# Patient Record
Sex: MALE | Race: OTHER | Employment: UNEMPLOYED | URBAN - METROPOLITAN AREA
[De-identification: names, ages, dates, MRNs, and addresses within clinical notes are randomized per-mention and may not be internally consistent; named-entity substitution may affect disease eponyms.]

---

## 2017-06-09 PROBLEM — N20.1 URETERAL CALCULUS, LEFT: Status: ACTIVE | Noted: 2017-06-09

## 2021-08-27 PROBLEM — F10.10 ALCOHOL ABUSE: Status: ACTIVE | Noted: 2021-08-27

## 2021-08-30 PROBLEM — F10.20 SEVERE ALCOHOL USE DISORDER (HCC): Chronic | Status: ACTIVE | Noted: 2021-08-27

## 2021-08-30 PROBLEM — F10.930 ALCOHOL WITHDRAWAL SYNDROME WITHOUT COMPLICATION (HCC): Status: ACTIVE | Noted: 2021-08-30

## 2021-08-31 PROBLEM — R31.0 GROSS HEMATURIA: Status: ACTIVE | Noted: 2021-08-31

## 2021-09-01 PROBLEM — F10.930 ALCOHOL WITHDRAWAL SYNDROME WITHOUT COMPLICATION (HCC): Status: RESOLVED | Noted: 2021-08-30 | Resolved: 2021-09-01

## 2021-09-01 PROBLEM — R31.0 GROSS HEMATURIA: Status: RESOLVED | Noted: 2021-08-31 | Resolved: 2021-09-01

## 2021-10-19 ENCOUNTER — APPOINTMENT (OUTPATIENT)
Dept: GENERAL RADIOLOGY | Age: 36
End: 2021-10-19
Payer: MEDICAID

## 2021-10-19 ENCOUNTER — HOSPITAL ENCOUNTER (EMERGENCY)
Age: 36
Discharge: HOME | End: 2021-10-19
Attending: EMERGENCY MEDICINE
Payer: MEDICAID

## 2021-10-19 VITALS
DIASTOLIC BLOOD PRESSURE: 89 MMHG | HEART RATE: 77 BPM | WEIGHT: 130 LBS | BODY MASS INDEX: 22.2 KG/M2 | HEIGHT: 64 IN | OXYGEN SATURATION: 98 % | RESPIRATION RATE: 16 BRPM | TEMPERATURE: 98.5 F | SYSTOLIC BLOOD PRESSURE: 140 MMHG

## 2021-10-19 DIAGNOSIS — R53.83 OTHER FATIGUE: Primary | ICD-10-CM

## 2021-10-19 LAB
ALBUMIN SERPL-MCNC: 5 G/DL (ref 3.5–5.2)
ALP BLD-CCNC: 90 U/L (ref 40–129)
ALT SERPL-CCNC: 12 U/L (ref 0–40)
ANION GAP SERPL CALCULATED.3IONS-SCNC: 8 MMOL/L (ref 7–16)
AST SERPL-CCNC: 40 U/L (ref 0–39)
BASOPHILS ABSOLUTE: 0.06 E9/L (ref 0–0.2)
BASOPHILS RELATIVE PERCENT: 0.7 % (ref 0–2)
BILIRUB SERPL-MCNC: 0.6 MG/DL (ref 0–1.2)
BILIRUBIN URINE: NEGATIVE
BLOOD, URINE: NEGATIVE
BUN BLDV-MCNC: 13 MG/DL (ref 6–20)
CALCIUM SERPL-MCNC: 10 MG/DL (ref 8.6–10.2)
CHLORIDE BLD-SCNC: 104 MMOL/L (ref 98–107)
CLARITY: CLEAR
CO2: 24 MMOL/L (ref 22–29)
COLOR: YELLOW
CREAT SERPL-MCNC: 1 MG/DL (ref 0.7–1.2)
EOSINOPHILS ABSOLUTE: 0.1 E9/L (ref 0.05–0.5)
EOSINOPHILS RELATIVE PERCENT: 1.2 % (ref 0–6)
GFR AFRICAN AMERICAN: >60
GFR NON-AFRICAN AMERICAN: >60 ML/MIN/1.73
GLUCOSE BLD-MCNC: 99 MG/DL (ref 74–99)
GLUCOSE URINE: NEGATIVE MG/DL
HCT VFR BLD CALC: 48.6 % (ref 37–54)
HEMOGLOBIN: 16.2 G/DL (ref 12.5–16.5)
IMMATURE GRANULOCYTES #: 0.02 E9/L
IMMATURE GRANULOCYTES %: 0.2 % (ref 0–5)
KETONES, URINE: NEGATIVE MG/DL
LEUKOCYTE ESTERASE, URINE: NEGATIVE
LIPASE: 49 U/L (ref 13–60)
LYMPHOCYTES ABSOLUTE: 2.87 E9/L (ref 1.5–4)
LYMPHOCYTES RELATIVE PERCENT: 34.7 % (ref 20–42)
MCH RBC QN AUTO: 32.4 PG (ref 26–35)
MCHC RBC AUTO-ENTMCNC: 33.3 % (ref 32–34.5)
MCV RBC AUTO: 97.2 FL (ref 80–99.9)
MONOCYTES ABSOLUTE: 0.6 E9/L (ref 0.1–0.95)
MONOCYTES RELATIVE PERCENT: 7.2 % (ref 2–12)
NEUTROPHILS ABSOLUTE: 4.63 E9/L (ref 1.8–7.3)
NEUTROPHILS RELATIVE PERCENT: 56 % (ref 43–80)
NITRITE, URINE: NEGATIVE
PDW BLD-RTO: 11.6 FL (ref 11.5–15)
PH UA: 7.5 (ref 5–9)
PLATELET # BLD: 208 E9/L (ref 130–450)
PMV BLD AUTO: 10 FL (ref 7–12)
POTASSIUM REFLEX MAGNESIUM: 6.5 MMOL/L (ref 3.5–5)
POTASSIUM SERPL-SCNC: 4.2 MMOL/L (ref 3.5–5)
PROTEIN UA: NEGATIVE MG/DL
RBC # BLD: 5 E12/L (ref 3.8–5.8)
REASON FOR REJECTION: NORMAL
REJECTED TEST: NORMAL
SODIUM BLD-SCNC: 136 MMOL/L (ref 132–146)
SPECIFIC GRAVITY UA: <=1.005 (ref 1–1.03)
TOTAL PROTEIN: 7.5 G/DL (ref 6.4–8.3)
TROPONIN, HIGH SENSITIVITY: <6 NG/L (ref 0–11)
UROBILINOGEN, URINE: 0.2 E.U./DL
WBC # BLD: 8.3 E9/L (ref 4.5–11.5)

## 2021-10-19 PROCEDURE — 81003 URINALYSIS AUTO W/O SCOPE: CPT

## 2021-10-19 PROCEDURE — 85025 COMPLETE CBC W/AUTO DIFF WBC: CPT

## 2021-10-19 PROCEDURE — 71045 X-RAY EXAM CHEST 1 VIEW: CPT

## 2021-10-19 PROCEDURE — 83690 ASSAY OF LIPASE: CPT

## 2021-10-19 PROCEDURE — 2580000003 HC RX 258: Performed by: EMERGENCY MEDICINE

## 2021-10-19 PROCEDURE — 84484 ASSAY OF TROPONIN QUANT: CPT

## 2021-10-19 PROCEDURE — 80053 COMPREHEN METABOLIC PANEL: CPT

## 2021-10-19 PROCEDURE — 93005 ELECTROCARDIOGRAM TRACING: CPT | Performed by: EMERGENCY MEDICINE

## 2021-10-19 PROCEDURE — 84132 ASSAY OF SERUM POTASSIUM: CPT

## 2021-10-19 PROCEDURE — 36415 COLL VENOUS BLD VENIPUNCTURE: CPT

## 2021-10-19 PROCEDURE — 99284 EMERGENCY DEPT VISIT MOD MDM: CPT

## 2021-10-19 RX ORDER — 0.9 % SODIUM CHLORIDE 0.9 %
1000 INTRAVENOUS SOLUTION INTRAVENOUS ONCE
Status: COMPLETED | OUTPATIENT
Start: 2021-10-19 | End: 2021-10-19

## 2021-10-19 RX ADMIN — SODIUM CHLORIDE 1000 ML: 9 INJECTION, SOLUTION INTRAVENOUS at 16:11

## 2021-10-19 ASSESSMENT — PAIN DESCRIPTION - ORIENTATION: ORIENTATION: LOWER

## 2021-10-19 ASSESSMENT — PAIN SCALES - GENERAL
PAINLEVEL_OUTOF10: 0
PAINLEVEL_OUTOF10: 6

## 2021-10-19 ASSESSMENT — PAIN DESCRIPTION - FREQUENCY: FREQUENCY: INTERMITTENT

## 2021-10-19 ASSESSMENT — PAIN DESCRIPTION - LOCATION: LOCATION: ABDOMEN

## 2021-10-19 ASSESSMENT — PAIN - FUNCTIONAL ASSESSMENT: PAIN_FUNCTIONAL_ASSESSMENT: PREVENTS OR INTERFERES SOME ACTIVE ACTIVITIES AND ADLS

## 2021-10-19 ASSESSMENT — PAIN DESCRIPTION - PAIN TYPE: TYPE: ACUTE PAIN

## 2021-10-19 ASSESSMENT — PAIN DESCRIPTION - ONSET: ONSET: ON-GOING

## 2021-10-19 ASSESSMENT — PAIN DESCRIPTION - DESCRIPTORS: DESCRIPTORS: RADIATING

## 2021-10-19 NOTE — ED NOTES
Bed: 15  Expected date:   Expected time:   Means of arrival:   Comments:  [id number] Tommie Denise, RN  10/19/21 [id number]

## 2021-10-19 NOTE — ED NOTES
Pt given discharge instructions and aware to call [facility] to be assigned a primary care physician.      Delray Fothergill, RN  10/19/21 [id number]

## 2021-10-20 LAB
EKG ATRIAL RATE: 63 BPM
EKG P AXIS: 27 DEGREES
EKG P-R INTERVAL: 122 MS
EKG Q-T INTERVAL: 424 MS
EKG QRS DURATION: 104 MS
EKG QTC CALCULATION (BAZETT): 433 MS
EKG R AXIS: 47 DEGREES
EKG T AXIS: 17 DEGREES
EKG VENTRICULAR RATE: 63 BPM

## 2021-10-20 PROCEDURE — 93010 ELECTROCARDIOGRAM REPORT: CPT | Performed by: INTERNAL MEDICINE

## 2021-10-24 NOTE — ED PROVIDER NOTES
Department of Emergency Medicine   ED  Provider Note  Admit Date/RoomTime: 10/19/2021  1:19 PM  ED Room: 15/15          History of Present Illness:  10/24/21, Time: 5:13 PM EDT  Chief Complaint   Patient presents with    Fatigue     dizzy earlier, felt like bp was high. Sent in from [facility]. Janny Nayak is a 29 y.o. male presenting to the ED for fatigue, beginning yesterday. The complaint has been intermittent, moderate in severity, and worsened by nothing. 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 He has been feeling good for the last week but last night he had acute onset of fatigue and lightheadedness. He felt like his blood pressure was elevated but it was normal last night. He then went to sleep and felt better but this happened again this morning which prompted his presentation. The patient denies any chest pain or dyspnea. Denies palpitations. Denies vertigo. Denies weakness paresthesias or loss of function. Denies abdominal pain nausea or vomiting. Denies stool changes. Denies urinary symptoms. Denies fever or chills. Patient admits to history of HIV on [facility] therapy with normal CD4 counts most recently. He is not on prophylactic antibiotics. Review of Systems:   Pertinent positives and negatives are stated within HPI, all other systems reviewed and are negative.        --------------------------------------------- PAST HISTORY ---------------------------------------------  Past Medical History:  has a past medical history of Arthritis, Asthma, Gross hematuria, HIV (human immunodeficiency virus infection) ([facility] [address] Mitral valve prolapse, Psychiatric problem, and Seizures ([facility] [address] Past Surgical History:  has a past surgical history that includes Colonoscopy; Abdomen surgery; Cystocopy (N/A, 05/09/2017); Ureteroscopy (Left, 06/09/2017); other surgical history; Neck surgery; and Tonsillectomy.     Social History:  reports that he has been smoking cigarettes. He started smoking about 14 years ago. He has a 2.50 pack-year smoking history. He has never used smokeless tobacco. He reports previous drug use. Family History: family history includes Cancer in his father and mother; Diabetes in his father; Hypertension in his father and mother; Stroke in his father. . Unless otherwise noted, family history is non contributory    The patients home medications have been reviewed. Allergies: Avocado, Banana, Cortisone, Sulfa antibiotics, and Yellow dyes (non-tartrazine)        ---------------------------------------------------PHYSICAL EXAM--------------------------------------    Constitutional/General: Alert and oriented x3  Head: Normocephalic and atraumatic. Dry mucosa. Eyes: PERRL, EOMI, sclera non icteric  Mouth: Oropharynx clear, handling secretions, no trismus, no asymmetry of the posterior oropharynx or uvular edema  Neck: Supple, full ROM, no stridor, no meningeal signs  Respiratory: Lungs clear to auscultation bilaterally,Not in respiratory distress  Cardiovascular:  Regular rate. Regular rhythm. 2+ distal pulses. Equal extremity pulses. Chest: No chest wall tenderness  GI:  Abdomen Soft, Non tender, Non distended. No rebound, guarding, or rigidity. Musculoskeletal: Moves all extremities x 4. Warm and well perfused, no clubbing, cyanosis, or edema. Capillary refill <3 seconds  Integument: skin warm and dry. No rashes. Neurologic: GCS 15, CN II-XII intact, no focal deficits, symmetric strength 5/5 in the upper and lower extremities bilaterally  Psychiatric: Normal Affect      EKG: Interpreted by emergency department physician, Dr. Thom Correa    This EKG is signed and interpreted by me.     Rate: 63  Rhythm: Sinus  Interpretation: right bundle branch block  Comparison: no previous EKG available      -------------------------------------------------- RESULTS -------------------------------------------------  I have personally reviewed all laboratory and imaging results for this patient. Results are listed below.      LABS: (Lab results interpreted by me)  Results for orders placed or performed during the hospital encounter of 10/19/21   CBC Auto Differential   Result Value Ref Range    WBC 8.3 4.5 - 11.5 E9/L    RBC 5.00 3.80 - 5.80 E12/L    Hemoglobin 16.2 12.5 - 16.5 g/dL    Hematocrit 48.6 37.0 - 54.0 %    MCV 97.2 80.0 - 99.9 fL    MCH 32.4 26.0 - 35.0 pg    MCHC 33.3 32.0 - 34.5 %    RDW 11.6 11.5 - 15.0 fL    Platelets 183 363 - 035 E9/L    MPV 10.0 7.0 - 12.0 fL    Neutrophils % 56.0 43.0 - 80.0 %    Immature Granulocytes % 0.2 0.0 - 5.0 %    Lymphocytes % 34.7 20.0 - 42.0 %    Monocytes % 7.2 2.0 - 12.0 %    Eosinophils % 1.2 0.0 - 6.0 %    Basophils % 0.7 0.0 - 2.0 %    Neutrophils Absolute 4.63 1.80 - 7.30 E9/L    Immature Granulocytes # 0.02 E9/L    Lymphocytes Absolute 2.87 1.50 - 4.00 E9/L    Monocytes Absolute 0.60 0.10 - 0.95 E9/L    Eosinophils Absolute 0.10 0.05 - 0.50 E9/L    Basophils Absolute 0.06 0.00 - 0.20 E9/L   Comprehensive Metabolic Panel w/ Reflex to MG   Result Value Ref Range    Sodium 136 132 - 146 mmol/L    Potassium reflex Magnesium 6.5 (H) 3.5 - 5.0 mmol/L    Chloride 104 98 - 107 mmol/L    CO2 24 22 - 29 mmol/L    Anion Gap 8 7 - 16 mmol/L    Glucose 99 74 - 99 mg/dL    BUN 13 6 - 20 mg/dL    CREATININE 1.0 0.7 - 1.2 mg/dL    GFR Non-African American >60 >=60 mL/min/1.73    GFR African American >60     Calcium 10.0 8.6 - 10.2 mg/dL    Total Protein 7.5 6.4 - 8.3 g/dL    Albumin 5.0 3.5 - 5.2 g/dL    Total Bilirubin 0.6 0.0 - 1.2 mg/dL    Alkaline Phosphatase 90 40 - 129 U/L    ALT 12 0 - 40 U/L    AST 40 (H) 0 - 39 U/L   Lipase   Result Value Ref Range    Lipase 49 13 - 60 U/L   Urinalysis, reflex to microscopic   Result Value Ref Range    Color, UA Yellow Straw/Yellow    Clarity, UA Clear Clear    Glucose, Ur Negative Negative mg/dL    Bilirubin Urine Negative Negative    Ketones, Urine Negative Negative mg/dL Specific Gravity, UA <=1.005 1.005 - 1.030    Blood, Urine Negative Negative    pH, UA 7.5 5.0 - 9.0    Protein, UA Negative Negative mg/dL    Urobilinogen, Urine 0.2 <2.0 E.U./dL    Nitrite, Urine Negative Negative    Leukocyte Esterase, Urine Negative Negative   SPECIMEN REJECTION   Result Value Ref Range    Rejected Test trop     Reason for Rejection see below    Potassium   Result Value Ref Range    Potassium 4.2 3.5 - 5.0 mmol/L   Troponin   Result Value Ref Range    Troponin, High Sensitivity <6 0 - 11 ng/L   EKG 12 Lead   Result Value Ref Range    Ventricular Rate 63 BPM    Atrial Rate 63 BPM    P-R Interval 122 ms    QRS Duration 104 ms    Q-T Interval 424 ms    QTc Calculation (Bazett) 433 ms    P Axis 27 degrees    R Axis 47 degrees    T Axis 17 degrees   ,       RADIOLOGY:  Interpreted by Radiologist unless otherwise specified  XR CHEST PORTABLE   Final Result   No pneumonia or pleural effusion.                          ------------------------- NURSING NOTES AND VITALS REVIEWED ---------------------------   The nursing notes within the ED encounter and vital signs as below have been reviewed by myself  BP (!) 140/89   Pulse 77   Temp 98.5 °F (36.9 °C) (Oral)   Resp 16   Ht 5' 4\" (1.626 m)   Wt 130 lb (59 kg)   SpO2 98%   BMI 22.31 kg/m²     Oxygen Saturation Interpretation: Normal    The cardiac monitor revealed NSR with a heart rate in the 70s as interpreted by me. The cardiac monitor was ordered secondary to the patient's heart rate and to monitor the patient for dysrhythmia. CPT 63592    The patients available past medical records and past encounters were reviewed.         ------------------------------ ED COURSE/MEDICAL DECISION MAKING----------------------  Medications   0.9 % sodium chloride bolus (0 mLs IntraVENous Stopped 10/19/21 3036)                    Medical Decision Making:     I, Dr. Betzaida Berumen am the primary provider of record    Patient had 2 acute episodes of fatigue and lightheadedness. We discussed that that could have been signs of heart block or arrhythmia but he shows no evidence of this while in the emergency department. The patient does appear somewhat dry and dehydration could contribute to this as well as the fact that his autonomic nervous system is likely readjusting to no longer having the balance of uppers and downers in the system but otherwise this was a nonspecific presentation with a reassuring work-up. The patient appears well and he feels better on reevaluation. His work-up was discussed and we discussed the fact that I do not believe he would benefit from further emergent diagnostics or acute admission. I believe he is safe for discharge to follow-up as outpatient and continue his rehabilitation which I believe is in his best interest.  We discussed the importance of close outpatient follow-up as well as signs symptoms for which to return to the emergency department. Re-Evaluations:       Improved on reevaluation. This patient's ED course included: a personal history and physicial examination, re-evaluation prior to disposition, multiple bedside re-evaluations, IV medications, cardiac monitoring, continuous pulse oximetry and complex medical decision making and emergency management    This patient has remained hemodynamically stable during their ED course. Counseling: The emergency provider has spoken with the patient and discussed todays results, in addition to providing specific details for the plan of care and counseling regarding the diagnosis and prognosis. Questions are answered at this time and they are agreeable with the plan.       --------------------------------- IMPRESSION AND DISPOSITION ---------------------------------    IMPRESSION  1. Other fatigue        DISPOSITION  Disposition: Discharge to home  Patient condition is good        NOTE: This report was transcribed using voice recognition software.  Every effort was made to ensure accuracy; however, inadvertent computerized transcription errors may be present        Wagner Singh MD  10/24/21 [id number]

## 2022-07-28 PROBLEM — F33.41 RECURRENT MAJOR DEPRESSIVE DISORDER, IN PARTIAL REMISSION (HCC): Status: ACTIVE | Noted: 2020-05-20

## 2022-07-28 PROBLEM — G43.909 MIGRAINE: Status: RESOLVED | Noted: 2020-05-20 | Resolved: 2022-07-28

## 2022-07-28 PROBLEM — F15.11 METHAMPHETAMINE ABUSE IN REMISSION (HCC): Status: ACTIVE | Noted: 2020-05-20

## 2022-07-28 PROBLEM — I10 PRIMARY HYPERTENSION: Status: ACTIVE | Noted: 2022-07-28

## 2022-07-28 PROBLEM — F33.41 RECURRENT MAJOR DEPRESSIVE DISORDER, IN PARTIAL REMISSION (HCC): Status: RESOLVED | Noted: 2020-05-20 | Resolved: 2022-07-28

## 2022-07-28 PROBLEM — I49.3 PVC'S (PREMATURE VENTRICULAR CONTRACTIONS): Status: ACTIVE | Noted: 2020-05-20

## 2022-07-28 PROBLEM — G43.909 MIGRAINE: Status: ACTIVE | Noted: 2020-05-20

## 2022-07-28 PROBLEM — N20.1 URETERAL CALCULUS, LEFT: Status: RESOLVED | Noted: 2017-06-09 | Resolved: 2022-07-28

## 2022-07-28 PROBLEM — J45.909 ASTHMA: Status: ACTIVE | Noted: 2020-05-20

## 2022-08-03 PROBLEM — F10.20 SEVERE ALCOHOL USE DISORDER (HCC): Chronic | Status: RESOLVED | Noted: 2021-08-27 | Resolved: 2022-08-03

## 2022-08-03 PROBLEM — F15.11 METHAMPHETAMINE ABUSE IN REMISSION (HCC): Status: RESOLVED | Noted: 2020-05-20 | Resolved: 2022-08-03

## 2023-02-03 ENCOUNTER — APPOINTMENT (OUTPATIENT)
Dept: CT IMAGING | Age: 38
End: 2023-02-03
Payer: MEDICAID

## 2023-02-03 ENCOUNTER — HOSPITAL ENCOUNTER (EMERGENCY)
Age: 38
Discharge: OTHER INSTITUTION | End: 2023-02-04
Attending: EMERGENCY MEDICINE
Payer: MEDICAID

## 2023-02-03 ENCOUNTER — APPOINTMENT (OUTPATIENT)
Dept: GENERAL RADIOLOGY | Age: 38
End: 2023-02-03
Payer: MEDICAID

## 2023-02-03 DIAGNOSIS — F10.921 ACUTE ALCOHOLIC INTOXICATION WITH DELIRIUM (HCC): Primary | ICD-10-CM

## 2023-02-03 LAB
ACETAMINOPHEN LEVEL: <5 MCG/ML (ref 10–30)
ALBUMIN SERPL-MCNC: 5 G/DL (ref 3.5–5.2)
ALP BLD-CCNC: 76 U/L (ref 40–129)
ALT SERPL-CCNC: 40 U/L (ref 0–40)
AMMONIA: 57 UMOL/L (ref 16–60)
ANION GAP SERPL CALCULATED.3IONS-SCNC: 13 MMOL/L (ref 7–16)
AST SERPL-CCNC: 45 U/L (ref 0–39)
BASOPHILS ABSOLUTE: 0.07 E9/L (ref 0–0.2)
BASOPHILS RELATIVE PERCENT: 0.8 % (ref 0–2)
BILIRUB SERPL-MCNC: <0.2 MG/DL (ref 0–1.2)
BILIRUBIN DIRECT: <0.2 MG/DL (ref 0–0.3)
BILIRUBIN, INDIRECT: ABNORMAL MG/DL (ref 0–1)
BUN BLDV-MCNC: 17 MG/DL (ref 6–20)
CALCIUM SERPL-MCNC: 9.5 MG/DL (ref 8.6–10.2)
CHLORIDE BLD-SCNC: 102 MMOL/L (ref 98–107)
CO2: 26 MMOL/L (ref 22–29)
CREAT SERPL-MCNC: 1 MG/DL (ref 0.7–1.2)
EOSINOPHILS ABSOLUTE: 0.01 E9/L (ref 0.05–0.5)
EOSINOPHILS RELATIVE PERCENT: 0.1 % (ref 0–6)
ETHANOL: 166 MG/DL (ref 0–0.08)
ETHANOL: 320 MG/DL (ref 0–0.08)
GFR SERPL CREATININE-BSD FRML MDRD: >60 ML/MIN/1.73
GLUCOSE BLD-MCNC: 69 MG/DL (ref 74–99)
HCT VFR BLD CALC: 43 % (ref 37–54)
HEMOGLOBIN: 14.5 G/DL (ref 12.5–16.5)
IMMATURE GRANULOCYTES #: 0.02 E9/L
IMMATURE GRANULOCYTES %: 0.2 % (ref 0–5)
INFLUENZA A BY PCR: NOT DETECTED
INFLUENZA B BY PCR: NOT DETECTED
LIPASE: 88 U/L (ref 13–60)
LYMPHOCYTES ABSOLUTE: 2.72 E9/L (ref 1.5–4)
LYMPHOCYTES RELATIVE PERCENT: 33 % (ref 20–42)
MAGNESIUM: 2.6 MG/DL (ref 1.6–2.6)
MCH RBC QN AUTO: 31.7 PG (ref 26–35)
MCHC RBC AUTO-ENTMCNC: 33.7 % (ref 32–34.5)
MCV RBC AUTO: 94.1 FL (ref 80–99.9)
MONOCYTES ABSOLUTE: 0.51 E9/L (ref 0.1–0.95)
MONOCYTES RELATIVE PERCENT: 6.2 % (ref 2–12)
NEUTROPHILS ABSOLUTE: 4.91 E9/L (ref 1.8–7.3)
NEUTROPHILS RELATIVE PERCENT: 59.7 % (ref 43–80)
PDW BLD-RTO: 11.8 FL (ref 11.5–15)
PHOSPHORUS: 2.5 MG/DL (ref 2.5–4.5)
PLATELET # BLD: 192 E9/L (ref 130–450)
PMV BLD AUTO: 9.7 FL (ref 7–12)
POTASSIUM SERPL-SCNC: 3.5 MMOL/L (ref 3.5–5)
RBC # BLD: 4.57 E12/L (ref 3.8–5.8)
SALICYLATE, SERUM: <0.3 MG/DL (ref 0–30)
SARS-COV-2, NAAT: NOT DETECTED
SODIUM BLD-SCNC: 141 MMOL/L (ref 132–146)
TOTAL PROTEIN: 7.8 G/DL (ref 6.4–8.3)
TRICYCLIC ANTIDEPRESSANTS SCREEN SERUM: NEGATIVE NG/ML
VALPROIC ACID LEVEL: 12 MCG/ML (ref 50–100)
WBC # BLD: 8.2 E9/L (ref 4.5–11.5)

## 2023-02-03 PROCEDURE — 96372 THER/PROPH/DIAG INJ SC/IM: CPT

## 2023-02-03 PROCEDURE — 83690 ASSAY OF LIPASE: CPT

## 2023-02-03 PROCEDURE — 80307 DRUG TEST PRSMV CHEM ANLYZR: CPT

## 2023-02-03 PROCEDURE — 80179 DRUG ASSAY SALICYLATE: CPT

## 2023-02-03 PROCEDURE — 93005 ELECTROCARDIOGRAM TRACING: CPT | Performed by: STUDENT IN AN ORGANIZED HEALTH CARE EDUCATION/TRAINING PROGRAM

## 2023-02-03 PROCEDURE — 96361 HYDRATE IV INFUSION ADD-ON: CPT

## 2023-02-03 PROCEDURE — 99285 EMERGENCY DEPT VISIT HI MDM: CPT

## 2023-02-03 PROCEDURE — 6370000000 HC RX 637 (ALT 250 FOR IP): Performed by: STUDENT IN AN ORGANIZED HEALTH CARE EDUCATION/TRAINING PROGRAM

## 2023-02-03 PROCEDURE — 70450 CT HEAD/BRAIN W/O DYE: CPT

## 2023-02-03 PROCEDURE — 82140 ASSAY OF AMMONIA: CPT

## 2023-02-03 PROCEDURE — 87635 SARS-COV-2 COVID-19 AMP PRB: CPT

## 2023-02-03 PROCEDURE — 80048 BASIC METABOLIC PNL TOTAL CA: CPT

## 2023-02-03 PROCEDURE — 80143 DRUG ASSAY ACETAMINOPHEN: CPT

## 2023-02-03 PROCEDURE — 85025 COMPLETE CBC W/AUTO DIFF WBC: CPT

## 2023-02-03 PROCEDURE — 96360 HYDRATION IV INFUSION INIT: CPT

## 2023-02-03 PROCEDURE — 80076 HEPATIC FUNCTION PANEL: CPT

## 2023-02-03 PROCEDURE — 72125 CT NECK SPINE W/O DYE: CPT

## 2023-02-03 PROCEDURE — 82077 ASSAY SPEC XCP UR&BREATH IA: CPT

## 2023-02-03 PROCEDURE — 87502 INFLUENZA DNA AMP PROBE: CPT

## 2023-02-03 PROCEDURE — 84100 ASSAY OF PHOSPHORUS: CPT

## 2023-02-03 PROCEDURE — 80164 ASSAY DIPROPYLACETIC ACD TOT: CPT

## 2023-02-03 PROCEDURE — 36415 COLL VENOUS BLD VENIPUNCTURE: CPT

## 2023-02-03 PROCEDURE — 83735 ASSAY OF MAGNESIUM: CPT

## 2023-02-03 PROCEDURE — 71045 X-RAY EXAM CHEST 1 VIEW: CPT

## 2023-02-03 PROCEDURE — 2580000003 HC RX 258: Performed by: EMERGENCY MEDICINE

## 2023-02-03 PROCEDURE — 6360000002 HC RX W HCPCS: Performed by: STUDENT IN AN ORGANIZED HEALTH CARE EDUCATION/TRAINING PROGRAM

## 2023-02-03 PROCEDURE — 2580000003 HC RX 258: Performed by: STUDENT IN AN ORGANIZED HEALTH CARE EDUCATION/TRAINING PROGRAM

## 2023-02-03 RX ORDER — DIPHENHYDRAMINE HYDROCHLORIDE, ZINC ACETATE 2; .1 G/100G; G/100G
CREAM TOPICAL ONCE
Status: COMPLETED | OUTPATIENT
Start: 2023-02-03 | End: 2023-02-03

## 2023-02-03 RX ORDER — SODIUM CHLORIDE 0.9 % (FLUSH) 0.9 %
5-40 SYRINGE (ML) INJECTION EVERY 12 HOURS SCHEDULED
Status: DISCONTINUED | OUTPATIENT
Start: 2023-02-03 | End: 2023-02-04 | Stop reason: HOSPADM

## 2023-02-03 RX ORDER — LORAZEPAM 2 MG/ML
1 INJECTION INTRAMUSCULAR
Status: DISCONTINUED | OUTPATIENT
Start: 2023-02-03 | End: 2023-02-04 | Stop reason: HOSPADM

## 2023-02-03 RX ORDER — 0.9 % SODIUM CHLORIDE 0.9 %
1000 INTRAVENOUS SOLUTION INTRAVENOUS ONCE
Status: COMPLETED | OUTPATIENT
Start: 2023-02-03 | End: 2023-02-04

## 2023-02-03 RX ORDER — LORAZEPAM 1 MG/1
1 TABLET ORAL
Status: DISCONTINUED | OUTPATIENT
Start: 2023-02-03 | End: 2023-02-04 | Stop reason: HOSPADM

## 2023-02-03 RX ORDER — LORAZEPAM 1 MG/1
2 TABLET ORAL
Status: DISCONTINUED | OUTPATIENT
Start: 2023-02-03 | End: 2023-02-04 | Stop reason: HOSPADM

## 2023-02-03 RX ORDER — DROPERIDOL 2.5 MG/ML
2.5 INJECTION, SOLUTION INTRAMUSCULAR; INTRAVENOUS ONCE
Status: COMPLETED | OUTPATIENT
Start: 2023-02-03 | End: 2023-02-03

## 2023-02-03 RX ORDER — LORAZEPAM 1 MG/1
4 TABLET ORAL
Status: DISCONTINUED | OUTPATIENT
Start: 2023-02-03 | End: 2023-02-04 | Stop reason: HOSPADM

## 2023-02-03 RX ORDER — THIAMINE HYDROCHLORIDE 100 MG/ML
100 INJECTION, SOLUTION INTRAMUSCULAR; INTRAVENOUS ONCE
Status: DISCONTINUED | OUTPATIENT
Start: 2023-02-03 | End: 2023-02-03

## 2023-02-03 RX ORDER — LORAZEPAM 2 MG/ML
4 INJECTION INTRAMUSCULAR
Status: DISCONTINUED | OUTPATIENT
Start: 2023-02-03 | End: 2023-02-04 | Stop reason: HOSPADM

## 2023-02-03 RX ORDER — LORAZEPAM 2 MG/ML
3 INJECTION INTRAMUSCULAR
Status: DISCONTINUED | OUTPATIENT
Start: 2023-02-03 | End: 2023-02-04 | Stop reason: HOSPADM

## 2023-02-03 RX ORDER — ONDANSETRON 2 MG/ML
4 INJECTION INTRAMUSCULAR; INTRAVENOUS ONCE
Status: DISCONTINUED | OUTPATIENT
Start: 2023-02-03 | End: 2023-02-04 | Stop reason: HOSPADM

## 2023-02-03 RX ORDER — LORAZEPAM 1 MG/1
3 TABLET ORAL
Status: DISCONTINUED | OUTPATIENT
Start: 2023-02-03 | End: 2023-02-04 | Stop reason: HOSPADM

## 2023-02-03 RX ORDER — SODIUM CHLORIDE 0.9 % (FLUSH) 0.9 %
5-40 SYRINGE (ML) INJECTION PRN
Status: DISCONTINUED | OUTPATIENT
Start: 2023-02-03 | End: 2023-02-04 | Stop reason: HOSPADM

## 2023-02-03 RX ORDER — LORAZEPAM 2 MG/ML
2 INJECTION INTRAMUSCULAR
Status: DISCONTINUED | OUTPATIENT
Start: 2023-02-03 | End: 2023-02-04 | Stop reason: HOSPADM

## 2023-02-03 RX ORDER — GAUZE BANDAGE 2" X 2"
100 BANDAGE TOPICAL ONCE
Status: COMPLETED | OUTPATIENT
Start: 2023-02-03 | End: 2023-02-03

## 2023-02-03 RX ORDER — SODIUM CHLORIDE 9 MG/ML
INJECTION, SOLUTION INTRAVENOUS PRN
Status: DISCONTINUED | OUTPATIENT
Start: 2023-02-03 | End: 2023-02-04 | Stop reason: HOSPADM

## 2023-02-03 RX ADMIN — Medication 100 MG: at 23:31

## 2023-02-03 RX ADMIN — SODIUM CHLORIDE 1000 ML: 9 INJECTION, SOLUTION INTRAVENOUS at 21:07

## 2023-02-03 RX ADMIN — Medication 10 ML: at 23:26

## 2023-02-03 RX ADMIN — DIPHENHYDRAMINE HYDROCHLORIDE, ZINC ACETATE: 2; .1 CREAM TOPICAL at 21:17

## 2023-02-03 RX ADMIN — DROPERIDOL 2.5 MG: 2.5 INJECTION, SOLUTION INTRAMUSCULAR; INTRAVENOUS at 18:22

## 2023-02-03 NOTE — ED PROVIDER NOTES
Department of Emergency Medicine   ED Provider Note  Admit Date/RoomTime: 2/3/2023  3:43 PM  ED Room: CHELSY/CHELSY          History of Present Illness:  2/3/23, Time: 3:48 PM ALFIE Poon is a 40 y.o. male with history of HIV on antiretroviral therapy, tobacco use disorder, alcohol use disorder, amphetamine use, hypertension, presenting to the ED for nausea and vomiting, alcohol intoxication. History severely limited due to patient's intoxication. Patient complaining of nausea and vomiting, states that he threw up multiple times prior to arrival.  Patient also complaining of chronic left lower quadrant abdominal pain, states that nobody takes this seriously. Patient denies any suicidal or homicidal ideation. Does endorse alcohol use, states he has been compliant with his antiretroviral medications. Has had diarrhea, no melena hematochezia. Denies any fevers or chills. Denies any chest pain or shortness of breath. Review of Systems:     Pertinent positives and negatives are stated within HPI.      --------------------------------------------- PAST HISTORY ---------------------------------------------  Past Medical History:  has a past medical history of Arthritis, Asthma, Gross hematuria, [facility] immunodeficiency virus [facility] Methamphetamine abuse in remission ([facility] Migraine, Primary hypertension, Psychiatric problem, Recurrent major depressive disorder, in partial remission ([facility] Seizures ([facility] Severe alcohol use disorder ([facility] and Ureteral calculus, left. Past Surgical History:  has a past surgical history that includes Colonoscopy; Abdomen surgery; Cystocopy (N/A, 05/09/2017); Ureteroscopy (Left, 06/09/2017); other surgical history; Neck surgery; Tonsillectomy; polypectomy; and other surgical history. Social History:  reports that he has been smoking cigarettes. He started smoking about 15 years ago. He has a 2.50 pack-year smoking history.  He has never used smokeless tobacco. He reports that he does not currently use alcohol. He reports that he does not currently use drugs. Family History: family history includes Cancer in his father and mother; Diabetes in his father; Hypertension in his father and mother; Stroke in his father. The patients home medications have been reviewed. Allergies: Avocado, Banana, Cortisone, Sulfa antibiotics, Sulfamethoxazole-trimethoprim, and Yellow dyes (non-tartrazine)      ---------------------------------------------------PHYSICAL EXAM--------------------------------------    Constitutional/General: Awake and alert, appears intoxicated  Head: Normocephalic and atraumatic  Eyes: EOMI, conjunctiva normal, sclera non icteric  Ears: Normal external ears  Nose: Normal external nose  Mouth: Moist mucous membranes, uvula midline  Neck: Supple, no stridor, no meningeal signs  Respiratory: Lungs clear to auscultation bilaterally, no wheezes, rales, or rhonchi. Not in respiratory distress  Cardiovascular: Tachycardic. Regular rhythm. No murmurs, no gallops, or rubs. 2+ distal pulses. Equal extremity pulses. Chest: No chest wall tenderness or deformity  GI:  Abdomen Soft, mildly tender left lower quadrant of the abdomen,, Non distended. No rebound, guarding, or rigidity. No pulsatile masses. Musculoskeletal: Moves all extremities x 4. Warm and well perfused, no clubbing, cyanosis, or edema. Capillary refill <3 seconds  Integument: skin warm and dry. Neurologic: GCS 14 (due to confusion/ intoxication), no focal deficits, alert and responsive, symmetric strength 5/5 in the major muscle groups of upper and lower extremities bilaterally  Psychiatric: Psychomotor retardation, slow to respond, labile mood, no signs of internal preoccupation    -------------------------------------------------- RESULTS -------------------------------------------------  I have personally reviewed and interpreted all laboratory and imaging results for this patient. Results are listed below.      LABS:  Results for orders placed or performed during the hospital encounter of 02/03/23   COVID-19, Rapid    Specimen: Nasopharyngeal Swab   Result Value Ref Range    SARS-CoV-2, NAAT Not Detected Not Detected   RAPID INFLUENZA A/B ANTIGENS    Specimen: Nasopharyngeal   Result Value Ref Range    Influenza A by PCR Not Detected Not Detected    Influenza B by PCR Not Detected Not Detected   Serum Drug Screen   Result Value Ref Range    Ethanol Lvl 320 mg/dL    Acetaminophen Level <5.0 (L) 10.0 - 13.3 mcg/mL    Salicylate, Serum <1.9 0.0 - 30.0 mg/dL    TCA Scrn NEGATIVE Cutoff:300 ng/mL   URINE DRUG SCREEN   Result Value Ref Range    Amphetamine Screen, Urine NOT DETECTED Negative <1000 ng/mL    Barbiturate Screen, Ur POSITIVE (A) Negative < 200 ng/mL    Benzodiazepine Screen, Urine NOT DETECTED Negative < 200 ng/mL    Cannabinoid Scrn, Ur NOT DETECTED Negative < 50ng/mL    Cocaine Metabolite Screen, Urine NOT DETECTED Negative < 300 ng/mL    Opiate Scrn, Ur NOT DETECTED Negative < 300ng/mL    PCP Screen, Urine NOT DETECTED Negative < 25 ng/mL    Methadone Screen, Urine NOT DETECTED Negative <300 ng/mL    Oxycodone Urine NOT DETECTED Negative <100 ng/mL    FENTANYL SCREEN, URINE NOT DETECTED Negative <1 ng/mL    Drug Screen Comment: see below    Urinalysis with Microscopic   Result Value Ref Range    Color, UA Yellow Straw/Yellow    Clarity, UA Clear Clear    Glucose, Ur Negative Negative mg/dL    Bilirubin Urine Negative Negative    Ketones, Urine Negative Negative mg/dL    Specific Gravity, UA 1.025 1.005 - 1.030    Blood, Urine Negative Negative    pH, UA 5.5 5.0 - 9.0    Protein, UA Negative Negative mg/dL    Urobilinogen, Urine 0.2 <2.0 E.U./dL    Nitrite, Urine Negative Negative    Leukocyte Esterase, Urine Negative Negative    WBC, UA 0-1 0 - 5 /HPF    RBC, UA NONE 0 - 2 /HPF    Bacteria, UA NONE SEEN None Seen /HPF   CBC with Auto Differential   Result Value Ref Range    WBC 8.2 4.5 - 11.5 E9/L    RBC 4.57 3.80 - 5.80 E12/L    Hemoglobin 14.5 12.5 - 16.5 g/dL    Hematocrit 43.0 37.0 - 54.0 %    MCV 94.1 80.0 - 99.9 fL    MCH 31.7 26.0 - 35.0 pg    MCHC 33.7 32.0 - 34.5 %    RDW 11.8 11.5 - 15.0 fL    Platelets 107 183 - 650 E9/L    MPV 9.7 7.0 - 12.0 fL    Neutrophils % 59.7 43.0 - 80.0 %    Immature Granulocytes % 0.2 0.0 - 5.0 %    Lymphocytes % 33.0 20.0 - 42.0 %    Monocytes % 6.2 2.0 - 12.0 %    Eosinophils % 0.1 0.0 - 6.0 %    Basophils % 0.8 0.0 - 2.0 %    Neutrophils Absolute 4.91 1.80 - 7.30 E9/L    Immature Granulocytes # 0.02 E9/L    Lymphocytes Absolute 2.72 1.50 - 4.00 E9/L    Monocytes Absolute 0.51 0.10 - 0.95 E9/L    Eosinophils Absolute 0.01 (L) 0.05 - 0.50 E9/L    Basophils Absolute 0.07 0.00 - 0.20 E9/L   BMP   Result Value Ref Range    Sodium 141 132 - 146 mmol/L    Potassium 3.5 3.5 - 5.0 mmol/L    Chloride 102 98 - 107 mmol/L    CO2 26 22 - 29 mmol/L    Anion Gap 13 7 - 16 mmol/L    Glucose 69 (L) 74 - 99 mg/dL    BUN 17 6 - 20 mg/dL    Creatinine 1.0 0.7 - 1.2 mg/dL    Est, Glom Filt Rate >60 >=60 mL/min/1.73    Calcium 9.5 8.6 - 10.2 mg/dL   Lipase   Result Value Ref Range    Lipase 88 (H) 13 - 60 U/L   Magnesium   Result Value Ref Range    Magnesium 2.6 1.6 - 2.6 mg/dL   Phosphorus   Result Value Ref Range    Phosphorus 2.5 2.5 - 4.5 mg/dL   Hepatic Function Panel   Result Value Ref Range    Total Protein 7.8 6.4 - 8.3 g/dL    Albumin 5.0 3.5 - 5.2 g/dL    Alkaline Phosphatase 76 40 - 129 U/L    ALT 40 0 - 40 U/L    AST 45 (H) 0 - 39 U/L    Total Bilirubin <0.2 0.0 - 1.2 mg/dL    Bilirubin, Direct <0.2 0.0 - 0.3 mg/dL    Bilirubin, Indirect see below 0.0 - 1.0 mg/dL   Valproic Acid Level, Total   Result Value Ref Range    Valproic Acid Lvl 12 (L) 50 - 100 mcg/mL   Ammonia   Result Value Ref Range    Ammonia 57.0 16.0 - 60.0 umol/L   Ethanol   Result Value Ref Range    Ethanol Lvl 166 mg/dL   Ethanol   Result Value Ref Range    Ethanol Lvl 96 mg/dL   Ethanol   Result Value Ref Range    Ethanol Lvl 33 mg/dL   EKG 12 Lead   Result Value Ref Range    Ventricular Rate 99 BPM    Atrial Rate 99 BPM    P-R Interval 124 ms    QRS Duration 98 ms    Q-T Interval 368 ms    QTc Calculation (Bazett) 472 ms    P Axis 69 degrees    R Axis 68 degrees    T Axis 34 degrees       RADIOLOGY:  Imaging Interpreted by Radiologist, initial wet read of imaging interpreted by myself  XR CHEST 1 VIEW   Final Result   No acute cardiopulmonary process. CT HEAD WO CONTRAST   Final Result   1. No acute abnormality seen in the brain or cervical spine. 2. Cyst of the cavum septum pellucidum. While usually asymptomatic, this   anomaly may present with headaches and other focal neurological deficits   (ataxia, seizures, palpable, emesis, syncope, visual or sensory motor   findings). CT CSpine W/O Contrast   Final Result   1. No acute abnormality seen in the brain or cervical spine. 2. Cyst of the cavum septum pellucidum. While usually asymptomatic, this   anomaly may present with headaches and other focal neurological deficits   (ataxia, seizures, palpable, emesis, syncope, visual or sensory motor   findings). EKG:    See ED Course for EKG documentation        ------------------------- NURSING NOTES AND VITALS REVIEWED ---------------------------   The nursing notes within the ED encounter and vital signs as below have been reviewed by myself. BP (!) 124/96   Pulse 96   Temp 97.8 °F (36.6 °C)   Resp 18   Wt 135 lb (61.2 kg)   SpO2 98%   BMI 23.17 kg/m²   Oxygen Saturation Interpretation: Normal    The patients available past medical records and past encounters were reviewed, see [facility] for details.         ------------------------------ ED COURSE/MEDICAL DECISION MAKING----------------------  Medications   0.9 % sodium chloride bolus (0 mLs IntraVENous Stopped 2/4/23 0031)   droperidol (INAPSINE) injection 2.5 mg (2.5 mg IntraMUSCular Given 2/3/23 1822) diphenhydrAMINE-zinc acetate cream ( Topical Given 2/3/23 2117)   thiamine mononitrate tablet 100 mg (100 mg Oral Given 2/3/23 2331)   acetaminophen (TYLENOL) tablet 650 mg (650 mg Oral Given 2/4/23 0350)            Medical Decision Making:     ED Course as of 02/04/23 1502   Fri Feb 03, 2023   1547 Reviewed patient's recent ED visit, seen at [address] in 1/24/2023, patient was seen for alcohol relapse, seeking help for alcohol use disorder. Patient had lab work, was scheduled to be admitted/placed for this and changed his mind and went home. [RH]   1743 EKG: This EKG is signed and interpreted by me. Rate: 99  Rhythm: Sinus and partial RBBB  Interpretation: no acute changes and partial RBBB, no acute ischemic changes,  ms. Comparison: stable as compared to patient's most recent EKG in August 2022. [RH]   35 20 43 Patient is now agitated, attempting to bite and fight with staff, unable to be verbally redirected. Medication ordered at this time. [RH]   Sat Feb 04, 2023   2282 Alcohol now 33. [facility] medically [facility]. [BB]      ED Course User Index  [BB] Enzo Linares DO  [RH] Ryley Samantha Fears, DO     This is a 28-year-old male presenting to the emergency department with alcohol intoxication. Patient initially with alcohol level greater than 300, with intermittent agitation. Patient was observed for many hours, until sober with alcohol level of 33. Given patient's initial confusion and tachycardia with heart rate greater than 150 initially, broad work-up was obtained. CT of the head was obtained with a cyst of the septum pellucidum, no other acute intracranial process. Patient with no focal neurodeficits that would correlate to this incidental finding. Chest x-ray without acute process. EKG with no acute ischemic changes. Patient's tachycardia spontaneously resolved.   Patient's valproic acid level low, no evidence of toxicity with no hyperammonemia. Patient's phosphorus and magnesium within normal limits, electrolytes within normal limits. Kidney function within normal limits. Patient's glucose mildly low at 69, patient was given food here in the emergency department. Patient with no leukocytosis or significant anemia. Flu and COVID testing negative. Patient's serum drug screen positive for ethanol with level of 320, but negative for acetaminophen and salicylate and TCAs. Urine drug screen positive for barbiturates. Urinalysis with no evidence of infection. Lipase mildly elevated 88, but not fitting criteria for pancreatitis, and patient with no epigastric abdominal pain. AST mildly elevated at 45, likely related to patient's alcohol use, bilirubin within normal limits. Patient had episode of agitation here in emergency department, required 2.5 mg of IM droperidol. Patient was given 100 mg of oral thiamine. Patient also given diphenhydramine topical cream for itchy area of skin that patient had on his arm. Patient was observed until sober, [facility]. [facility]. History From: chart review, patient    Social Determinants of Health : alcohol use disorder    Chronic Conditions affecting care: alcohol use disorder   has a past medical history of Arthritis, Asthma, Gross hematuria (08/31/2021), HIV (human immunodeficiency virus infection) ([facility] Methamphetamine abuse in remission ([facility] (5/20/2020), Migraine (05/20/2020), Primary hypertension (07/28/2022), Psychiatric problem, Recurrent major depressive disorder, in partial remission ([address] (05/20/2020), Seizures ([address] Severe alcohol use disorder ([facility] (8/27/2021), and Ureteral calculus, left (06/09/2017).      See ED COURSE    Disposition Considerations (Tests not ordered but considered, Shared Decision Making, Pt Expectation of Test or Tx.): consideration of detox, patient not interested. Diagnoses considered include (but not limited to): Intracranial hemorrhage, pneumothorax, pneumonia, renal failure, electrolyte abnormality, anemia, liver failure, dysrhythmia    See ED COURSE for additional MDM. Labs & imaging reviewed and interpreted, see RESULTS above. Re-Evaluations:           See ED Course above. This patient's ED course included: a personal history and physicial examination, re-evaluation prior to disposition, multiple bedside re-evaluations, and cardiac monitoring    This patient has been closely monitored during their ED course. Consultations:  See ED Course    Counseling: The emergency physician has spoken with the patient and discussed todays results, in addition to providing specific details for the plan of care and counseling regarding the diagnosis and prognosis. Questions are answered at this time and they are agreeable with the plan.       --------------------------------- IMPRESSION AND DISPOSITION ---------------------------------    IMPRESSION  1. Acute alcoholic intoxication with delirium ([facility] Improving       DISPOSITION  Disposition: Discharge to rehab  Patient condition is stable    NOTE: This report was transcribed using voice recognition software. Every effort was made to ensure accuracy; however, inadvertent computerized transcription errors may be present. Also please note that patient was seen and examined by attending physician. Plan of care and disposition discussed with attending physician and they were immediately available or present for all procedures performed.        -- Faustino David D.O. PGY-3     Emergency Medicine      2/4/2023 3:02 PM         [address] DO  Resident  02/04/23 9546 family and social) and exam findings with the Resident and the Nurse assigned to Solitario Valencia. I have personally performed and/or participated in the history, exam, medical decision making, and procedures and agree with all pertinent clinical information. I have reviewed my findings and recommendations with Solitario Valencia and members of family present at the time of disposition. I have personally reviewed all laboratory radiology results from today's visit. I have personally reviewed and agree with resident interpretation of EKG for all EKGs from today's visit. MDM:     I, Dr. Nicky Waite am the primary provider of record    Medical Decision Making        EKG is ordered to have documentation of patient's current rhythm, and to rule out any obvious acute cardiac illnesses such as ACS. Additionally, QT interval may be of use in decision making regarding any medications administered here in the ED. Patient is placed on cardiac monitor and continuous pulse ox for monitoring. CBC is ordered to evaluate for any signs of infection or inflammation by obtaining a WBC count, or any signs of acute anemia by interpreting hemoglobin. CMP was ordered to evaluate for any electrolyte imbalances, kidney function, or any elevations in anion gap. Troponin ordered to evaluate for possible cardiac etiology of symptoms including but not limited to STEMI or NSTEMI. CT head without contrast was ordered to evaluate for acute or chronic intracranial hemorrhage or masses. Chest x-ray is ordered to evaluate for any possible signs of pneumonia, pleural effusions, cardiomegaly, pneumothorax, atelectasis, rib or sternal abnormalities including fractures. CT head and cervical spine is ordered to evaluate for findings including but not limited to hemorrhage, fractures, subluxation or displacement. CT cervical spine ordered to evaluate for any fractures or displacements due to history of presenting symptoms.       Amount and/or Complexity of Data Reviewed  Labs: ordered. Radiology: ordered. ECG/medicine tests: ordered. Risk  OTC drugs. Prescription drug management. This X-Ray is independently viewed and interpreted by me:   - Study: Chest X-Ray   - Number of Views: 1  - Findings: No pneumothorax    My findings/plan: The encounter diagnosis was Acute alcoholic intoxication with delirium ([facility]   Discharge Medication List as of 2/4/2023  9:20 AM        Jade Love, DO Jade Love DO  02/07/23 5265

## 2023-02-03 NOTE — ED NOTES
Pt woke up and became noncompliant and combative with staff.  Droperidol given IM and pt now resting quiet in bed      Lakesha Costello, MICHAEL  02/03/23 9072

## 2023-02-04 VITALS
WEIGHT: 135 LBS | RESPIRATION RATE: 18 BRPM | BODY MASS INDEX: 23.17 KG/M2 | SYSTOLIC BLOOD PRESSURE: 124 MMHG | OXYGEN SATURATION: 98 % | HEART RATE: 96 BPM | TEMPERATURE: 97.8 F | DIASTOLIC BLOOD PRESSURE: 96 MMHG

## 2023-02-04 LAB
AMPHETAMINE SCREEN, URINE: NOT DETECTED
BACTERIA: NORMAL /HPF
BARBITURATE SCREEN URINE: POSITIVE
BENZODIAZEPINE SCREEN, URINE: NOT DETECTED
BILIRUBIN URINE: NEGATIVE
BLOOD, URINE: NEGATIVE
CANNABINOID SCREEN URINE: NOT DETECTED
CLARITY: CLEAR
COCAINE METABOLITE SCREEN URINE: NOT DETECTED
COLOR: YELLOW
ETHANOL: 33 MG/DL (ref 0–0.08)
ETHANOL: 96 MG/DL (ref 0–0.08)
FENTANYL SCREEN, URINE: NOT DETECTED
GLUCOSE URINE: NEGATIVE MG/DL
KETONES, URINE: NEGATIVE MG/DL
LEUKOCYTE ESTERASE, URINE: NEGATIVE
Lab: ABNORMAL
METHADONE SCREEN, URINE: NOT DETECTED
NITRITE, URINE: NEGATIVE
OPIATE SCREEN URINE: NOT DETECTED
OXYCODONE URINE: NOT DETECTED
PH UA: 5.5 (ref 5–9)
PHENCYCLIDINE SCREEN URINE: NOT DETECTED
PROTEIN UA: NEGATIVE MG/DL
RBC UA: NORMAL /HPF (ref 0–2)
SPECIFIC GRAVITY UA: 1.02 (ref 1–1.03)
UROBILINOGEN, URINE: 0.2 E.U./DL
WBC UA: NORMAL /HPF (ref 0–5)

## 2023-02-04 PROCEDURE — 36415 COLL VENOUS BLD VENIPUNCTURE: CPT

## 2023-02-04 PROCEDURE — 6370000000 HC RX 637 (ALT 250 FOR IP): Performed by: STUDENT IN AN ORGANIZED HEALTH CARE EDUCATION/TRAINING PROGRAM

## 2023-02-04 PROCEDURE — 81001 URINALYSIS AUTO W/SCOPE: CPT

## 2023-02-04 PROCEDURE — 82077 ASSAY SPEC XCP UR&BREATH IA: CPT

## 2023-02-04 PROCEDURE — 80307 DRUG TEST PRSMV CHEM ANLYZR: CPT

## 2023-02-04 RX ORDER — ACETAMINOPHEN 325 MG/1
650 TABLET ORAL ONCE
Status: COMPLETED | OUTPATIENT
Start: 2023-02-04 | End: 2023-02-04

## 2023-02-04 RX ADMIN — ACETAMINOPHEN 650 MG: 325 TABLET ORAL at 03:50

## 2023-02-04 ASSESSMENT — PAIN SCALES - GENERAL: PAINLEVEL_OUTOF10: 10

## 2023-02-04 NOTE — ED NOTES
[facility]oke with [facility]. Last ethanol level of 166 is acceptable for [facility] at [facility]. ETA for pickup is 0830 at this time unless appropriate staffing becomes available before then.       Narciso Yusuf RN  02/04/23 0202

## 2023-02-04 NOTE — ED NOTES
Spoke with Dr. Britt Valdivia about infiltrated IV access and overdue orders. Okay to hold due medications at this time due to lack of IV access and pt condition.       Daniel Winn RN  02/03/23 7724

## 2023-02-04 NOTE — ED NOTES
Last CIWA score was 5. Per protocol for scores less than 8, reassessment is due 8 hours from 22:11.      Polo Ram, RN  02/03/23 4359

## 2023-02-04 NOTE — ED NOTES
Pt given some food and drink - turkey sandwich, apple sauce, flex crackers, and haider mist. Pt cooperative at this time and tolerating PO intake.       Fabian Ronquillo RN  02/03/23 2013

## 2023-02-04 NOTE — ED NOTES
Pt ripped off telemetry leads during combative behavior. Currently not attached to telemetry, provider aware.      Max Post RN  02/03/23 1088

## 2023-02-05 LAB
EKG ATRIAL RATE: 99 BPM
EKG P AXIS: 69 DEGREES
EKG P-R INTERVAL: 124 MS
EKG Q-T INTERVAL: 368 MS
EKG QRS DURATION: 98 MS
EKG QTC CALCULATION (BAZETT): 472 MS
EKG R AXIS: 68 DEGREES
EKG T AXIS: 34 DEGREES
EKG VENTRICULAR RATE: 99 BPM

## 2023-02-05 PROCEDURE — 93010 ELECTROCARDIOGRAM REPORT: CPT | Performed by: INTERNAL MEDICINE

## 2023-07-05 ENCOUNTER — HOSPITAL ENCOUNTER (EMERGENCY)
Age: 38
Discharge: LEFT BEFORE DISCHARGE | End: 2023-07-05

## 2023-07-05 VITALS
RESPIRATION RATE: 16 BRPM | BODY MASS INDEX: 23.17 KG/M2 | TEMPERATURE: 98.1 F | DIASTOLIC BLOOD PRESSURE: 111 MMHG | HEART RATE: 120 BPM | OXYGEN SATURATION: 97 % | SYSTOLIC BLOOD PRESSURE: 141 MMHG | WEIGHT: 135 LBS

## 2023-07-05 DIAGNOSIS — Z53.21 ELOPED FROM EMERGENCY DEPARTMENT: Primary | ICD-10-CM

## 2023-07-05 NOTE — ED NOTES
Patients friend said he will take him somewhere else, due to him having a curfew at 10     [facility] [place]  07/05/23 1952

## 2023-08-21 ENCOUNTER — HOSPITAL ENCOUNTER (OUTPATIENT)
Age: 38
Discharge: HOME | End: 2023-08-23

## 2023-08-21 PROCEDURE — 88305 TISSUE EXAM BY PATHOLOGIST: CPT

## 2023-08-24 LAB — SURGICAL PATHOLOGY REPORT: NORMAL

## 2023-09-21 ENCOUNTER — HOSPITAL ENCOUNTER (OUTPATIENT)
Age: 38
Discharge: HOME | End: 2023-09-21
Payer: MEDICAID

## 2023-09-21 LAB
ALBUMIN SERPL-MCNC: 5.3 G/DL (ref 3.5–5.2)
ALP SERPL-CCNC: 78 U/L (ref 40–129)
ALT SERPL-CCNC: 13 U/L (ref 0–40)
ANION GAP SERPL CALCULATED.3IONS-SCNC: 10 MMOL/L (ref 7–16)
AST SERPL-CCNC: 16 U/L (ref 0–39)
BILIRUB SERPL-MCNC: 0.4 MG/DL (ref 0–1.2)
BUN SERPL-MCNC: 15 MG/DL (ref 6–20)
CALCIUM SERPL-MCNC: 9.9 MG/DL (ref 8.6–10.2)
CHLORIDE SERPL-SCNC: 96 MMOL/L (ref 98–107)
CHOLEST SERPL-MCNC: 194 MG/DL
CO2 SERPL-SCNC: 25 MMOL/L (ref 22–29)
CREAT SERPL-MCNC: 1 MG/DL (ref 0.7–1.2)
GFR SERPL CREATININE-BSD FRML MDRD: >60 ML/MIN/1.73M2
GLUCOSE SERPL-MCNC: 70 MG/DL (ref 74–99)
HDLC SERPL-MCNC: 75 MG/DL
LDLC SERPL CALC-MCNC: 101 MG/DL
POTASSIUM SERPL-SCNC: 4.2 MMOL/L (ref 3.5–5)
PROT SERPL-MCNC: 8.1 G/DL (ref 6.4–8.3)
SODIUM SERPL-SCNC: 131 MMOL/L (ref 132–146)
T4 FREE SERPL-MCNC: 1.3 NG/DL (ref 0.9–1.7)
TRIGL SERPL-MCNC: 88 MG/DL
TSH SERPL DL<=0.05 MIU/L-ACNC: 0.03 UIU/ML (ref 0.27–4.2)
VLDLC SERPL CALC-MCNC: 18 MG/DL

## 2023-09-21 PROCEDURE — 36415 COLL VENOUS BLD VENIPUNCTURE: CPT

## 2023-09-21 PROCEDURE — 83036 HEMOGLOBIN GLYCOSYLATED A1C: CPT

## 2023-09-21 PROCEDURE — 86361 T CELL ABSOLUTE COUNT: CPT

## 2023-09-21 PROCEDURE — 80061 LIPID PANEL: CPT

## 2023-09-21 PROCEDURE — 80053 COMPREHEN METABOLIC PANEL: CPT

## 2023-09-21 PROCEDURE — 82306 VITAMIN D 25 HYDROXY: CPT

## 2023-09-21 PROCEDURE — 84439 ASSAY OF FREE THYROXINE: CPT

## 2023-09-21 PROCEDURE — 86592 SYPHILIS TEST NON-TREP QUAL: CPT

## 2023-09-21 PROCEDURE — 80074 ACUTE HEPATITIS PANEL: CPT

## 2023-09-21 PROCEDURE — 84443 ASSAY THYROID STIM HORMONE: CPT

## 2023-09-22 LAB
25(OH)D3 SERPL-MCNC: 80.5 NG/ML (ref 30–100)
HAV IGM SERPL QL IA: NONREACTIVE
HBV CORE IGM SERPL QL IA: NONREACTIVE
HBV SURFACE AG SERPL QL IA: NONREACTIVE
HCV AB SERPL QL IA: NONREACTIVE
RPR SER QL: NONREACTIVE

## 2023-10-05 ENCOUNTER — APPOINTMENT (OUTPATIENT)
Dept: CT IMAGING | Age: 38
End: 2023-10-05
Payer: MEDICAID

## 2023-10-05 ENCOUNTER — HOSPITAL ENCOUNTER (EMERGENCY)
Age: 38
Discharge: HOME | End: 2023-10-05
Payer: MEDICAID

## 2023-10-05 VITALS
TEMPERATURE: 97.8 F | HEART RATE: 88 BPM | OXYGEN SATURATION: 97 % | WEIGHT: 135 LBS | DIASTOLIC BLOOD PRESSURE: 80 MMHG | SYSTOLIC BLOOD PRESSURE: 121 MMHG | RESPIRATION RATE: 16 BRPM | BODY MASS INDEX: 23.17 KG/M2

## 2023-10-05 DIAGNOSIS — S00.83XA CONTUSION OF FACE, INITIAL ENCOUNTER: ICD-10-CM

## 2023-10-05 DIAGNOSIS — S09.90XA CLOSED HEAD INJURY, INITIAL ENCOUNTER: Primary | ICD-10-CM

## 2023-10-05 DIAGNOSIS — S16.1XXA ACUTE STRAIN OF NECK MUSCLE, INITIAL ENCOUNTER: ICD-10-CM

## 2023-10-05 DIAGNOSIS — T14.8XXA ABRASION: ICD-10-CM

## 2023-10-05 PROCEDURE — 99284 EMERGENCY DEPT VISIT MOD MDM: CPT

## 2023-10-05 PROCEDURE — 70450 CT HEAD/BRAIN W/O DYE: CPT

## 2023-10-05 PROCEDURE — 90714 TD VACC NO PRESV 7 YRS+ IM: CPT | Performed by: NURSE PRACTITIONER

## 2023-10-05 PROCEDURE — 72125 CT NECK SPINE W/O DYE: CPT

## 2023-10-05 PROCEDURE — 6360000002 HC RX W HCPCS: Performed by: NURSE PRACTITIONER

## 2023-10-05 PROCEDURE — 6370000000 HC RX 637 (ALT 250 FOR IP): Performed by: NURSE PRACTITIONER

## 2023-10-05 PROCEDURE — 70486 CT MAXILLOFACIAL W/O DYE: CPT

## 2023-10-05 PROCEDURE — 90471 IMMUNIZATION ADMIN: CPT | Performed by: NURSE PRACTITIONER

## 2023-10-05 RX ORDER — IBUPROFEN 200 MG
TABLET ORAL
Qty: 1 EACH | Refills: 0 | Status: SHIPPED | OUTPATIENT
Start: 2023-10-05

## 2023-10-05 RX ORDER — NAPROXEN 500 MG/1
500 TABLET ORAL 2 TIMES DAILY PRN
Qty: 28 TABLET | Refills: 0 | Status: SHIPPED | OUTPATIENT
Start: 2023-10-05

## 2023-10-05 RX ORDER — METHOCARBAMOL 500 MG/1
500 TABLET, FILM COATED ORAL 4 TIMES DAILY
Qty: 20 TABLET | Refills: 0 | Status: SHIPPED | OUTPATIENT
Start: 2023-10-05 | End: 2023-10-10

## 2023-10-05 RX ORDER — 0.9 % SODIUM CHLORIDE 0.9 %
1000 INTRAVENOUS SOLUTION INTRAVENOUS ONCE
Status: DISCONTINUED | OUTPATIENT
Start: 2023-10-05 | End: 2023-10-05 | Stop reason: HOSPADM

## 2023-10-05 RX ORDER — TETANUS AND DIPHTHERIA TOXOIDS ADSORBED 2; 2 [LF]/.5ML; [LF]/.5ML
0.5 INJECTION INTRAMUSCULAR ONCE
Status: COMPLETED | OUTPATIENT
Start: 2023-10-05 | End: 2023-10-05

## 2023-10-05 RX ORDER — KETOROLAC TROMETHAMINE 30 MG/ML
15 INJECTION, SOLUTION INTRAMUSCULAR; INTRAVENOUS ONCE
Status: DISCONTINUED | OUTPATIENT
Start: 2023-10-05 | End: 2023-10-05 | Stop reason: HOSPADM

## 2023-10-05 RX ORDER — BACITRACIN ZINC 500 [USP'U]/G
OINTMENT TOPICAL ONCE
Status: COMPLETED | OUTPATIENT
Start: 2023-10-05 | End: 2023-10-05

## 2023-10-05 RX ORDER — POLYMYXIN B SULFATE AND TRIMETHOPRIM 1; 10000 MG/ML; [USP'U]/ML
1 SOLUTION OPHTHALMIC EVERY 4 HOURS
Qty: 3 ML | Refills: 0 | Status: SHIPPED | OUTPATIENT
Start: 2023-10-05 | End: 2023-10-15

## 2023-10-05 RX ADMIN — BACITRACIN ZINC: 500 OINTMENT TOPICAL at 12:38

## 2023-10-05 RX ADMIN — TETANUS AND DIPHTHERIA TOXOIDS ADSORBED 0.5 ML: 2; 2 INJECTION INTRAMUSCULAR at 12:36

## 2023-10-05 ASSESSMENT — LIFESTYLE VARIABLES
HOW MANY STANDARD DRINKS CONTAINING ALCOHOL DO YOU HAVE ON A TYPICAL DAY: PATIENT DOES NOT DRINK
HOW OFTEN DO YOU HAVE A DRINK CONTAINING ALCOHOL: NEVER

## 2023-10-05 NOTE — ED NOTES
Refused blood work and IV after speaking to patient about lab tests ordered     Breezy Ronquillo, [address]  10/05/23 7382

## 2023-10-09 ENCOUNTER — APPOINTMENT (OUTPATIENT)
Dept: CT IMAGING | Age: 38
End: 2023-10-09
Attending: STUDENT IN AN ORGANIZED HEALTH CARE EDUCATION/TRAINING PROGRAM
Payer: MEDICAID

## 2023-10-09 ENCOUNTER — HOSPITAL ENCOUNTER (EMERGENCY)
Age: 38
Discharge: HOME | End: 2023-10-10
Attending: STUDENT IN AN ORGANIZED HEALTH CARE EDUCATION/TRAINING PROGRAM
Payer: MEDICAID

## 2023-10-09 ENCOUNTER — APPOINTMENT (OUTPATIENT)
Dept: GENERAL RADIOLOGY | Age: 38
End: 2023-10-09
Payer: MEDICAID

## 2023-10-09 DIAGNOSIS — F10.929 ACUTE ALCOHOLIC INTOXICATION WITH COMPLICATION (HCC): Primary | ICD-10-CM

## 2023-10-09 DIAGNOSIS — F10.10 ALCOHOL ABUSE: ICD-10-CM

## 2023-10-09 LAB
ALBUMIN SERPL-MCNC: 4.8 G/DL (ref 3.5–5.2)
ALP SERPL-CCNC: 81 U/L (ref 40–129)
ALT SERPL-CCNC: 50 U/L (ref 0–40)
ANION GAP SERPL CALCULATED.3IONS-SCNC: 17 MMOL/L (ref 7–16)
APAP SERPL-MCNC: <5 UG/ML (ref 10–30)
AST SERPL-CCNC: 78 U/L (ref 0–39)
BASOPHILS # BLD: 0.05 K/UL (ref 0–0.2)
BASOPHILS NFR BLD: 1 % (ref 0–2)
BILIRUB SERPL-MCNC: 0.3 MG/DL (ref 0–1.2)
BUN SERPL-MCNC: 29 MG/DL (ref 6–20)
CALCIUM SERPL-MCNC: 9.2 MG/DL (ref 8.6–10.2)
CHLORIDE SERPL-SCNC: 99 MMOL/L (ref 98–107)
CO2 SERPL-SCNC: 24 MMOL/L (ref 22–29)
CREAT SERPL-MCNC: 0.8 MG/DL (ref 0.7–1.2)
EOSINOPHIL # BLD: 0.06 K/UL (ref 0.05–0.5)
EOSINOPHILS RELATIVE PERCENT: 1 % (ref 0–6)
ERYTHROCYTE [DISTWIDTH] IN BLOOD BY AUTOMATED COUNT: 12.1 % (ref 11.5–15)
ETHANOLAMINE SERPL-MCNC: 407 MG/DL
GFR SERPL CREATININE-BSD FRML MDRD: >60 ML/MIN/1.73M2
GLUCOSE SERPL-MCNC: 158 MG/DL (ref 74–99)
HCT VFR BLD AUTO: 48.9 % (ref 37–54)
HGB BLD-MCNC: 16.8 G/DL (ref 12.5–16.5)
IMM GRANULOCYTES # BLD AUTO: <0.03 K/UL (ref 0–0.58)
IMM GRANULOCYTES NFR BLD: 0 % (ref 0–5)
LYMPHOCYTES NFR BLD: 3.66 K/UL (ref 1.5–4)
LYMPHOCYTES RELATIVE PERCENT: 55 % (ref 20–42)
MCH RBC QN AUTO: 32.1 PG (ref 26–35)
MCHC RBC AUTO-ENTMCNC: 34.4 G/DL (ref 32–34.5)
MCV RBC AUTO: 93.3 FL (ref 80–99.9)
MONOCYTES NFR BLD: 0.56 K/UL (ref 0.1–0.95)
MONOCYTES NFR BLD: 8 % (ref 2–12)
NEUTROPHILS NFR BLD: 35 % (ref 43–80)
NEUTS SEG NFR BLD: 2.36 K/UL (ref 1.8–7.3)
PLATELET # BLD AUTO: 222 K/UL (ref 130–450)
PMV BLD AUTO: 9.4 FL (ref 7–12)
POTASSIUM SERPL-SCNC: 4.5 MMOL/L (ref 3.5–5)
PROT SERPL-MCNC: 7.5 G/DL (ref 6.4–8.3)
RBC # BLD AUTO: 5.24 M/UL (ref 3.8–5.8)
SALICYLATES SERPL-MCNC: <0.3 MG/DL (ref 0–30)
SODIUM SERPL-SCNC: 140 MMOL/L (ref 132–146)
TOXIC TRICYCLIC SC,BLOOD: NEGATIVE
TROPONIN I SERPL HS-MCNC: 7 NG/L (ref 0–11)
WBC OTHER # BLD: 6.7 K/UL (ref 4.5–11.5)

## 2023-10-09 PROCEDURE — 80307 DRUG TEST PRSMV CHEM ANLYZR: CPT

## 2023-10-09 PROCEDURE — 85025 COMPLETE CBC W/AUTO DIFF WBC: CPT

## 2023-10-09 PROCEDURE — 72125 CT NECK SPINE W/O DYE: CPT

## 2023-10-09 PROCEDURE — G0480 DRUG TEST DEF 1-7 CLASSES: HCPCS

## 2023-10-09 PROCEDURE — 96374 THER/PROPH/DIAG INJ IV PUSH: CPT

## 2023-10-09 PROCEDURE — 70450 CT HEAD/BRAIN W/O DYE: CPT

## 2023-10-09 PROCEDURE — 2580000003 HC RX 258: Performed by: STUDENT IN AN ORGANIZED HEALTH CARE EDUCATION/TRAINING PROGRAM

## 2023-10-09 PROCEDURE — 84484 ASSAY OF TROPONIN QUANT: CPT

## 2023-10-09 PROCEDURE — 6360000002 HC RX W HCPCS: Performed by: STUDENT IN AN ORGANIZED HEALTH CARE EDUCATION/TRAINING PROGRAM

## 2023-10-09 PROCEDURE — 96361 HYDRATE IV INFUSION ADD-ON: CPT

## 2023-10-09 PROCEDURE — 71045 X-RAY EXAM CHEST 1 VIEW: CPT

## 2023-10-09 PROCEDURE — 80053 COMPREHEN METABOLIC PANEL: CPT

## 2023-10-09 PROCEDURE — 80143 DRUG ASSAY ACETAMINOPHEN: CPT

## 2023-10-09 PROCEDURE — 99285 EMERGENCY DEPT VISIT HI MDM: CPT

## 2023-10-09 PROCEDURE — 80179 DRUG ASSAY SALICYLATE: CPT

## 2023-10-09 PROCEDURE — 93005 ELECTROCARDIOGRAM TRACING: CPT | Performed by: STUDENT IN AN ORGANIZED HEALTH CARE EDUCATION/TRAINING PROGRAM

## 2023-10-09 RX ORDER — RIMEGEPANT SULFATE 75 MG/75MG
75 TABLET, ORALLY DISINTEGRATING ORAL AS NEEDED
COMMUNITY

## 2023-10-09 RX ORDER — HYDROXYZINE HYDROCHLORIDE 25 MG/1
25 TABLET, FILM COATED ORAL 3 TIMES DAILY PRN
COMMUNITY

## 2023-10-09 RX ORDER — DOCUSATE SODIUM 100 MG/1
100 CAPSULE, LIQUID FILLED ORAL AS NEEDED
COMMUNITY

## 2023-10-09 RX ORDER — LORAZEPAM 2 MG/ML
1 INJECTION INTRAMUSCULAR ONCE
Status: COMPLETED | OUTPATIENT
Start: 2023-10-09 | End: 2023-10-09

## 2023-10-09 RX ORDER — 0.9 % SODIUM CHLORIDE 0.9 %
1000 INTRAVENOUS SOLUTION INTRAVENOUS ONCE
Status: COMPLETED | OUTPATIENT
Start: 2023-10-09 | End: 2023-10-10

## 2023-10-09 RX ADMIN — LORAZEPAM 1 MG: 2 INJECTION INTRAMUSCULAR; INTRAVENOUS at 19:00

## 2023-10-09 RX ADMIN — SODIUM CHLORIDE 1000 ML: 9 INJECTION, SOLUTION INTRAVENOUS at 18:59

## 2023-10-09 NOTE — ED NOTES
Pt is unable to answer triage questions at this time. Boyfriend at bedside answering questions.       Andrzej Ansari RN  10/09/23 5866

## 2023-10-10 VITALS
OXYGEN SATURATION: 96 % | TEMPERATURE: 97.8 F | RESPIRATION RATE: 22 BRPM | HEART RATE: 85 BPM | DIASTOLIC BLOOD PRESSURE: 82 MMHG | SYSTOLIC BLOOD PRESSURE: 157 MMHG

## 2023-10-10 LAB
AMPHET UR QL SCN: NEGATIVE
BARBITURATES UR QL SCN: NEGATIVE
BENZODIAZ UR QL: NEGATIVE
CANNABINOIDS UR QL SCN: NEGATIVE
COCAINE UR QL SCN: NEGATIVE
EKG ATRIAL RATE: 113 BPM
EKG P AXIS: 66 DEGREES
EKG P-R INTERVAL: 120 MS
EKG Q-T INTERVAL: 338 MS
EKG QRS DURATION: 96 MS
EKG QTC CALCULATION (BAZETT): 463 MS
EKG R AXIS: 63 DEGREES
EKG T AXIS: 31 DEGREES
EKG VENTRICULAR RATE: 113 BPM
ETHANOLAMINE SERPL-MCNC: 76 MG/DL
FENTANYL UR QL: NEGATIVE
METHADONE UR QL: NEGATIVE
OPIATES UR QL SCN: NEGATIVE
OXYCODONE UR QL SCN: NEGATIVE
PCP UR QL SCN: NEGATIVE
TEST INFORMATION: NORMAL

## 2023-10-10 PROCEDURE — 6370000000 HC RX 637 (ALT 250 FOR IP): Performed by: EMERGENCY MEDICINE

## 2023-10-10 PROCEDURE — 6360000002 HC RX W HCPCS: Performed by: EMERGENCY MEDICINE

## 2023-10-10 PROCEDURE — 93010 ELECTROCARDIOGRAM REPORT: CPT | Performed by: INTERNAL MEDICINE

## 2023-10-10 PROCEDURE — 96375 TX/PRO/DX INJ NEW DRUG ADDON: CPT

## 2023-10-10 PROCEDURE — 96376 TX/PRO/DX INJ SAME DRUG ADON: CPT

## 2023-10-10 PROCEDURE — 80307 DRUG TEST PRSMV CHEM ANLYZR: CPT

## 2023-10-10 PROCEDURE — 2580000003 HC RX 258: Performed by: EMERGENCY MEDICINE

## 2023-10-10 PROCEDURE — 6370000000 HC RX 637 (ALT 250 FOR IP): Performed by: PHYSICIAN ASSISTANT

## 2023-10-10 PROCEDURE — G0480 DRUG TEST DEF 1-7 CLASSES: HCPCS

## 2023-10-10 PROCEDURE — 6360000002 HC RX W HCPCS: Performed by: STUDENT IN AN ORGANIZED HEALTH CARE EDUCATION/TRAINING PROGRAM

## 2023-10-10 RX ORDER — LORAZEPAM 1 MG/1
3 TABLET ORAL
Status: DISCONTINUED | OUTPATIENT
Start: 2023-10-10 | End: 2023-10-10 | Stop reason: HOSPADM

## 2023-10-10 RX ORDER — LANOLIN ALCOHOL/MO/W.PET/CERES
100 CREAM (GRAM) TOPICAL DAILY
Status: DISCONTINUED | OUTPATIENT
Start: 2023-10-10 | End: 2023-10-10 | Stop reason: HOSPADM

## 2023-10-10 RX ORDER — THIAMINE HYDROCHLORIDE 100 MG/ML
100 INJECTION, SOLUTION INTRAMUSCULAR; INTRAVENOUS ONCE
Status: COMPLETED | OUTPATIENT
Start: 2023-10-10 | End: 2023-10-10

## 2023-10-10 RX ORDER — LORAZEPAM 2 MG/ML
4 INJECTION INTRAMUSCULAR
Status: DISCONTINUED | OUTPATIENT
Start: 2023-10-10 | End: 2023-10-10 | Stop reason: HOSPADM

## 2023-10-10 RX ORDER — LORAZEPAM 1 MG/1
1 TABLET ORAL
Status: DISCONTINUED | OUTPATIENT
Start: 2023-10-10 | End: 2023-10-10 | Stop reason: HOSPADM

## 2023-10-10 RX ORDER — SODIUM CHLORIDE 0.9 % (FLUSH) 0.9 %
5-40 SYRINGE (ML) INJECTION EVERY 12 HOURS SCHEDULED
Status: DISCONTINUED | OUTPATIENT
Start: 2023-10-10 | End: 2023-10-10 | Stop reason: HOSPADM

## 2023-10-10 RX ORDER — LORAZEPAM 1 MG/1
4 TABLET ORAL
Status: DISCONTINUED | OUTPATIENT
Start: 2023-10-10 | End: 2023-10-10 | Stop reason: HOSPADM

## 2023-10-10 RX ORDER — ONDANSETRON 4 MG/1
4 TABLET, ORALLY DISINTEGRATING ORAL ONCE
Status: COMPLETED | OUTPATIENT
Start: 2023-10-10 | End: 2023-10-10

## 2023-10-10 RX ORDER — LORAZEPAM 1 MG/1
2 TABLET ORAL
Status: DISCONTINUED | OUTPATIENT
Start: 2023-10-10 | End: 2023-10-10 | Stop reason: HOSPADM

## 2023-10-10 RX ORDER — ACETAMINOPHEN 500 MG
1000 TABLET ORAL ONCE
Status: COMPLETED | OUTPATIENT
Start: 2023-10-10 | End: 2023-10-10

## 2023-10-10 RX ORDER — SODIUM CHLORIDE 9 MG/ML
INJECTION, SOLUTION INTRAVENOUS PRN
Status: DISCONTINUED | OUTPATIENT
Start: 2023-10-10 | End: 2023-10-10 | Stop reason: HOSPADM

## 2023-10-10 RX ORDER — LORAZEPAM 2 MG/ML
2 INJECTION INTRAMUSCULAR
Status: DISCONTINUED | OUTPATIENT
Start: 2023-10-10 | End: 2023-10-10 | Stop reason: HOSPADM

## 2023-10-10 RX ORDER — SODIUM CHLORIDE 0.9 % (FLUSH) 0.9 %
5-40 SYRINGE (ML) INJECTION PRN
Status: DISCONTINUED | OUTPATIENT
Start: 2023-10-10 | End: 2023-10-10 | Stop reason: HOSPADM

## 2023-10-10 RX ORDER — LORAZEPAM 2 MG/ML
3 INJECTION INTRAMUSCULAR
Status: DISCONTINUED | OUTPATIENT
Start: 2023-10-10 | End: 2023-10-10 | Stop reason: HOSPADM

## 2023-10-10 RX ORDER — LORAZEPAM 2 MG/ML
1 INJECTION INTRAMUSCULAR
Status: DISCONTINUED | OUTPATIENT
Start: 2023-10-10 | End: 2023-10-10 | Stop reason: HOSPADM

## 2023-10-10 RX ADMIN — SODIUM CHLORIDE, PRESERVATIVE FREE 10 ML: 5 INJECTION INTRAVENOUS at 10:47

## 2023-10-10 RX ADMIN — ONDANSETRON 4 MG: 4 TABLET, ORALLY DISINTEGRATING ORAL at 13:48

## 2023-10-10 RX ADMIN — LORAZEPAM 2 MG: 2 INJECTION INTRAMUSCULAR; INTRAVENOUS at 13:10

## 2023-10-10 RX ADMIN — ACETAMINOPHEN 1000 MG: 500 TABLET ORAL at 11:04

## 2023-10-10 RX ADMIN — LORAZEPAM 3 MG: 2 INJECTION INTRAMUSCULAR; INTRAVENOUS at 06:47

## 2023-10-10 RX ADMIN — THIAMINE HYDROCHLORIDE 100 MG: 100 INJECTION, SOLUTION INTRAMUSCULAR; INTRAVENOUS at 02:18

## 2023-10-10 RX ADMIN — ACETAMINOPHEN 1000 MG: 500 TABLET ORAL at 02:17

## 2023-10-10 RX ADMIN — Medication 100 MG: at 10:47

## 2023-10-10 ASSESSMENT — PAIN DESCRIPTION - LOCATION: LOCATION: HEAD

## 2023-10-10 NOTE — ED PROVIDER NOTES
mis-transcribed.)    Silvina Bryan DO (electronically signed)       Silvina Bryan,   10/10/23 Stephenie Scheuermann, DO  10/11/23 2011

## 2023-10-10 NOTE — ED NOTES
Jarrod Becerra- [phone]  Father Verle Fillers [phone]     Lindsey Solitario RN  10/09/23 2025

## 2023-10-10 NOTE — CARE COORDINATION
Peer Recovery Support Note    Name: Fátima Montanez  Date: 10/10/2023    Chief Complaint   Patient presents with    Alcohol Problem    Altered Mental Status     Pt here  a week ago for fall and head injury. Relapsed on alcohol. Pt boyfriend states that he has hx of seizures and drinking for 3 days after 90 day clean. Pt alert to self only        Peer Support met with patient. [] Support and education provided  [] Resources provided   [x] Treatment referral: [facility]  [x] Other:   [] Patient declined peer recovery services     Referred By: Geoffrey Maurice (RANDOLPH)    Notes: Patient info was faxed to [facility] @10:45a, waiting to see if patient will be accepted.      Patient has been accepted, [facility] will pick him up @ 4:00p, they will be calling  @4445 on arrival.     Signed: Carlos Eduardo Jacobo, 10/10/2023

## 2023-10-10 NOTE — ED NOTES
[facility] SW came to assess patient. Patient was medicated and unable to stay awake for assessment. [facility] SW will assess patient when he is awake and alert. [facility] SW asked patient's RN to notify her at [phone] when patient is able to stay awake.      KANDI Fernandes, [place]  10/10/23 7442

## 2023-10-10 NOTE — ED NOTES
SW met with pt jarrod Elisa Castro [phone] who stated that pt completed AOD treatment program at [facility] on August 6th. Jarrod said he contacted [facility] yesterday who refused to take pt back due to pt being intoxicated. Joryjozef Gloria said [facility] will take pt back \"when he is sober. \" Vega Curtis noted pt was kicked out of [facility] where jarrod believes pt might have been getting treatment for psychiatric needs but is not sure. Jarrod said pt just broke a 90 day sobriety. Jarrod said he was leaving, has work in the am until 4:30pm. Vega Curtis offered help with transporting to [facility] if after 4:30pm. SW monitoring for when pt is appropriate to be seen for assessment.      Ran Alarcon, [place]  10/10/23 1801 [place], [place]  10/10/23 6395

## 2024-02-14 ENCOUNTER — HOSPITAL ENCOUNTER (EMERGENCY)
Age: 39
Discharge: HOME | End: 2024-02-14
Attending: STUDENT IN AN ORGANIZED HEALTH CARE EDUCATION/TRAINING PROGRAM
Payer: MEDICAID

## 2024-02-14 VITALS
OXYGEN SATURATION: 94 % | SYSTOLIC BLOOD PRESSURE: 121 MMHG | RESPIRATION RATE: 16 BRPM | DIASTOLIC BLOOD PRESSURE: 53 MMHG | HEIGHT: 64 IN | TEMPERATURE: 98.9 F | HEART RATE: 93 BPM | WEIGHT: 130 LBS | BODY MASS INDEX: 22.2 KG/M2

## 2024-02-14 DIAGNOSIS — J10.1 INFLUENZA B: Primary | ICD-10-CM

## 2024-02-14 LAB
FLUAV RNA RESP QL NAA+PROBE: NOT DETECTED
FLUBV RNA RESP QL NAA+PROBE: DETECTED
SARS-COV-2 RNA RESP QL NAA+PROBE: NOT DETECTED
SOURCE: ABNORMAL
SPECIMEN DESCRIPTION: ABNORMAL

## 2024-02-14 PROCEDURE — 99283 EMERGENCY DEPT VISIT LOW MDM: CPT

## 2024-02-14 PROCEDURE — 87636 SARSCOV2 & INF A&B AMP PRB: CPT

## 2024-02-14 RX ORDER — OSELTAMIVIR PHOSPHATE 75 MG/1
75 CAPSULE ORAL 2 TIMES DAILY
Qty: 10 CAPSULE | Refills: 0 | Status: SHIPPED | OUTPATIENT
Start: 2024-02-14 | End: 2024-02-19

## 2024-02-14 RX ORDER — ONDANSETRON 4 MG/1
4 TABLET, ORALLY DISINTEGRATING ORAL 3 TIMES DAILY PRN
Qty: 21 TABLET | Refills: 0 | Status: SHIPPED | OUTPATIENT
Start: 2024-02-14

## 2024-02-14 NOTE — ED PROVIDER NOTES
[facility] EMERGENCY DEPARTMENT  EMERGENCY DEPARTMENT ENCOUNTER        Pt Name: Alfonso Ospina  MRN: [id number]  Birthdate 1985  Date of evaluation: 2/14/2024  Provider: Estephania Powell MD  PCP: Carli Maki PA  Note Started: 4:51 PM EST 2/14/24    HPI  38 y.o. male presenting for body aches, headache, cough, nausea, and emesis.  Patient's partner recently had influenza.  Patient has been sick for the past 2 days.  He complains of body aches, headaches, cough, nausea, emesis.  He states his temp was 99F at home.  He denies any diarrhea.      --------------------------------------------- PAST HISTORY ---------------------------------------------  Past Medical History:  has a past medical history of Arthritis, Asthma, Gross hematuria, HIV (human immunodeficiency virus infection) (HCC), Methamphetamine abuse in remission (HCC), Migraine, Primary hypertension, Psychiatric problem, Recurrent major depressive disorder, in partial remission (HCC), Seizures (HCC), Severe alcohol use disorder (HCC), and Ureteral calculus, left.    Past Surgical History:  has a past surgical history that includes Colonoscopy; Abdomen surgery; Cystocopy (N/A, 05/09/2017); Ureteroscopy (Left, 06/09/2017); other surgical history; Neck surgery; Tonsillectomy; polypectomy; and other surgical history.    Social History:  reports that he has been smoking cigarettes. He started smoking about 16 years ago. He has a 4.2 pack-year smoking history. He has never used smokeless tobacco. He reports that he does not currently use alcohol. He reports that he does not currently use drugs.    Family History: family history includes Cancer in his father and mother; Diabetes in his father; Hypertension in his father and mother; Stroke in his father.     The patient’s home medications have been reviewed.    Allergies: Avocado, Banana, Cortisone, Sulfa antibiotics, Sulfamethoxazole-trimethoprim, and Yellow dyes

## 2024-02-29 ENCOUNTER — HOSPITAL ENCOUNTER (INPATIENT)
Age: 39
LOS: 1 days | Discharge: HOME | End: 2024-03-01
Attending: EMERGENCY MEDICINE | Admitting: INTERNAL MEDICINE
Payer: MEDICAID

## 2024-02-29 ENCOUNTER — APPOINTMENT (OUTPATIENT)
Dept: CT IMAGING | Age: 39
End: 2024-02-29
Payer: MEDICAID

## 2024-02-29 DIAGNOSIS — I10 ESSENTIAL HYPERTENSION: ICD-10-CM

## 2024-02-29 DIAGNOSIS — R56.9 SEIZURE (HCC): Primary | ICD-10-CM

## 2024-02-29 DIAGNOSIS — E88.89 ALCOHOLIC KETOSIS (HCC): ICD-10-CM

## 2024-02-29 LAB
ALBUMIN SERPL-MCNC: 4.4 G/DL (ref 3.5–5.2)
ALP SERPL-CCNC: 64 U/L (ref 40–129)
ALT SERPL-CCNC: 31 U/L (ref 0–40)
ANION GAP SERPL CALCULATED.3IONS-SCNC: 19 MMOL/L (ref 7–16)
APAP SERPL-MCNC: <5 UG/ML (ref 10–30)
AST SERPL-CCNC: 57 U/L (ref 0–39)
BASOPHILS # BLD: 0.06 K/UL (ref 0–0.2)
BASOPHILS NFR BLD: 1 % (ref 0–2)
BILIRUB SERPL-MCNC: 0.4 MG/DL (ref 0–1.2)
BUN SERPL-MCNC: 24 MG/DL (ref 6–20)
CALCIUM SERPL-MCNC: 8.9 MG/DL (ref 8.6–10.2)
CHLORIDE SERPL-SCNC: 99 MMOL/L (ref 98–107)
CO2 SERPL-SCNC: 24 MMOL/L (ref 22–29)
CREAT SERPL-MCNC: 1.1 MG/DL (ref 0.7–1.2)
EOSINOPHIL # BLD: 0.01 K/UL (ref 0.05–0.5)
EOSINOPHILS RELATIVE PERCENT: 0 % (ref 0–6)
ERYTHROCYTE [DISTWIDTH] IN BLOOD BY AUTOMATED COUNT: 12.3 % (ref 11.5–15)
ETHANOLAMINE SERPL-MCNC: 282 MG/DL
GFR SERPL CREATININE-BSD FRML MDRD: >60 ML/MIN/1.73M2
GLUCOSE SERPL-MCNC: 68 MG/DL (ref 74–99)
HCT VFR BLD AUTO: 42.3 % (ref 37–54)
HGB BLD-MCNC: 14.1 G/DL (ref 12.5–16.5)
IMM GRANULOCYTES # BLD AUTO: <0.03 K/UL (ref 0–0.58)
IMM GRANULOCYTES NFR BLD: 0 % (ref 0–5)
LYMPHOCYTES NFR BLD: 3.25 K/UL (ref 1.5–4)
LYMPHOCYTES RELATIVE PERCENT: 41 % (ref 20–42)
MCH RBC QN AUTO: 32.9 PG (ref 26–35)
MCHC RBC AUTO-ENTMCNC: 33.3 G/DL (ref 32–34.5)
MCV RBC AUTO: 98.6 FL (ref 80–99.9)
MONOCYTES NFR BLD: 0.64 K/UL (ref 0.1–0.95)
MONOCYTES NFR BLD: 8 % (ref 2–12)
NEUTROPHILS NFR BLD: 50 % (ref 43–80)
NEUTS SEG NFR BLD: 3.96 K/UL (ref 1.8–7.3)
PLATELET # BLD AUTO: 239 K/UL (ref 130–450)
PMV BLD AUTO: 10.1 FL (ref 7–12)
POTASSIUM SERPL-SCNC: 3.6 MMOL/L (ref 3.5–5)
PROT SERPL-MCNC: 6.7 G/DL (ref 6.4–8.3)
RBC # BLD AUTO: 4.29 M/UL (ref 3.8–5.8)
SALICYLATES SERPL-MCNC: <0.3 MG/DL (ref 0–30)
SODIUM SERPL-SCNC: 142 MMOL/L (ref 132–146)
TOXIC TRICYCLIC SC,BLOOD: NEGATIVE
VALPROATE SERPL-MCNC: 83 UG/ML (ref 50–100)
WBC OTHER # BLD: 7.9 K/UL (ref 4.5–11.5)

## 2024-02-29 PROCEDURE — 80053 COMPREHEN METABOLIC PANEL: CPT

## 2024-02-29 PROCEDURE — 2060000000 HC ICU INTERMEDIATE R&B

## 2024-02-29 PROCEDURE — G0480 DRUG TEST DEF 1-7 CLASSES: HCPCS

## 2024-02-29 PROCEDURE — 99285 EMERGENCY DEPT VISIT HI MDM: CPT

## 2024-02-29 PROCEDURE — 85025 COMPLETE CBC W/AUTO DIFF WBC: CPT

## 2024-02-29 PROCEDURE — 6360000002 HC RX W HCPCS

## 2024-02-29 PROCEDURE — 96375 TX/PRO/DX INJ NEW DRUG ADDON: CPT

## 2024-02-29 PROCEDURE — 70450 CT HEAD/BRAIN W/O DYE: CPT

## 2024-02-29 PROCEDURE — 6360000002 HC RX W HCPCS: Performed by: NURSE PRACTITIONER

## 2024-02-29 PROCEDURE — 80179 DRUG ASSAY SALICYLATE: CPT

## 2024-02-29 PROCEDURE — 93005 ELECTROCARDIOGRAM TRACING: CPT

## 2024-02-29 PROCEDURE — 80307 DRUG TEST PRSMV CHEM ANLYZR: CPT

## 2024-02-29 PROCEDURE — 80143 DRUG ASSAY ACETAMINOPHEN: CPT

## 2024-02-29 PROCEDURE — 2580000003 HC RX 258

## 2024-02-29 PROCEDURE — 80164 ASSAY DIPROPYLACETIC ACD TOT: CPT

## 2024-02-29 PROCEDURE — 2500000003 HC RX 250 WO HCPCS: Performed by: EMERGENCY MEDICINE

## 2024-02-29 PROCEDURE — 96374 THER/PROPH/DIAG INJ IV PUSH: CPT

## 2024-02-29 RX ORDER — LORAZEPAM 1 MG/1
2 TABLET ORAL
Status: DISCONTINUED | OUTPATIENT
Start: 2024-02-29 | End: 2024-03-01 | Stop reason: HOSPADM

## 2024-02-29 RX ORDER — BENZONATATE 100 MG/1
100 CAPSULE ORAL 3 TIMES DAILY PRN
Status: DISCONTINUED | OUTPATIENT
Start: 2024-02-29 | End: 2024-03-01 | Stop reason: HOSPADM

## 2024-02-29 RX ORDER — ACETAMINOPHEN 650 MG/1
650 SUPPOSITORY RECTAL EVERY 6 HOURS PRN
Status: DISCONTINUED | OUTPATIENT
Start: 2024-02-29 | End: 2024-03-01 | Stop reason: HOSPADM

## 2024-02-29 RX ORDER — SODIUM CHLORIDE 0.9 % (FLUSH) 0.9 %
5-40 SYRINGE (ML) INJECTION EVERY 12 HOURS SCHEDULED
Status: DISCONTINUED | OUTPATIENT
Start: 2024-02-29 | End: 2024-03-01 | Stop reason: HOSPADM

## 2024-02-29 RX ORDER — HYDRALAZINE HYDROCHLORIDE 20 MG/ML
10 INJECTION INTRAMUSCULAR; INTRAVENOUS EVERY 6 HOURS PRN
Status: DISCONTINUED | OUTPATIENT
Start: 2024-02-29 | End: 2024-03-01 | Stop reason: HOSPADM

## 2024-02-29 RX ORDER — SODIUM CHLORIDE 0.9 % (FLUSH) 0.9 %
5-40 SYRINGE (ML) INJECTION PRN
Status: DISCONTINUED | OUTPATIENT
Start: 2024-02-29 | End: 2024-03-01 | Stop reason: HOSPADM

## 2024-02-29 RX ORDER — CALCIUM CARBONATE 500 MG/1
500 TABLET, CHEWABLE ORAL 3 TIMES DAILY PRN
Status: DISCONTINUED | OUTPATIENT
Start: 2024-02-29 | End: 2024-03-01 | Stop reason: HOSPADM

## 2024-02-29 RX ORDER — LANOLIN ALCOHOL/MO/W.PET/CERES
3 CREAM (GRAM) TOPICAL NIGHTLY PRN
Status: DISCONTINUED | OUTPATIENT
Start: 2024-03-01 | End: 2024-03-01 | Stop reason: HOSPADM

## 2024-02-29 RX ORDER — POLYETHYLENE GLYCOL 3350 17 G/17G
17 POWDER, FOR SOLUTION ORAL DAILY PRN
Status: DISCONTINUED | OUTPATIENT
Start: 2024-02-29 | End: 2024-03-01 | Stop reason: HOSPADM

## 2024-02-29 RX ORDER — LORAZEPAM 1 MG/1
3 TABLET ORAL
Status: DISCONTINUED | OUTPATIENT
Start: 2024-02-29 | End: 2024-03-01 | Stop reason: HOSPADM

## 2024-02-29 RX ORDER — SODIUM CHLORIDE 9 MG/ML
INJECTION, SOLUTION INTRAVENOUS PRN
Status: DISCONTINUED | OUTPATIENT
Start: 2024-02-29 | End: 2024-03-01 | Stop reason: HOSPADM

## 2024-02-29 RX ORDER — LORAZEPAM 2 MG/ML
2 INJECTION INTRAMUSCULAR ONCE
Status: COMPLETED | OUTPATIENT
Start: 2024-02-29 | End: 2024-02-29

## 2024-02-29 RX ORDER — POTASSIUM CHLORIDE 20 MEQ/1
40 TABLET, EXTENDED RELEASE ORAL PRN
Status: DISCONTINUED | OUTPATIENT
Start: 2024-02-29 | End: 2024-03-01 | Stop reason: HOSPADM

## 2024-02-29 RX ORDER — POTASSIUM CHLORIDE 7.45 MG/ML
10 INJECTION INTRAVENOUS PRN
Status: DISCONTINUED | OUTPATIENT
Start: 2024-02-29 | End: 2024-03-01 | Stop reason: HOSPADM

## 2024-02-29 RX ORDER — ONDANSETRON 2 MG/ML
4 INJECTION INTRAMUSCULAR; INTRAVENOUS EVERY 6 HOURS PRN
Status: DISCONTINUED | OUTPATIENT
Start: 2024-02-29 | End: 2024-03-01 | Stop reason: HOSPADM

## 2024-02-29 RX ORDER — ACETAMINOPHEN 325 MG/1
650 TABLET ORAL EVERY 6 HOURS PRN
Status: DISCONTINUED | OUTPATIENT
Start: 2024-02-29 | End: 2024-03-01 | Stop reason: HOSPADM

## 2024-02-29 RX ORDER — LORAZEPAM 1 MG/1
4 TABLET ORAL
Status: DISCONTINUED | OUTPATIENT
Start: 2024-02-29 | End: 2024-03-01 | Stop reason: HOSPADM

## 2024-02-29 RX ORDER — LORAZEPAM 2 MG/ML
3 INJECTION INTRAMUSCULAR
Status: DISCONTINUED | OUTPATIENT
Start: 2024-02-29 | End: 2024-03-01 | Stop reason: HOSPADM

## 2024-02-29 RX ORDER — LORAZEPAM 2 MG/ML
4 INJECTION INTRAMUSCULAR
Status: DISCONTINUED | OUTPATIENT
Start: 2024-02-29 | End: 2024-03-01 | Stop reason: HOSPADM

## 2024-02-29 RX ORDER — DEXTROSE AND SODIUM CHLORIDE 5; .9 G/100ML; G/100ML
INJECTION, SOLUTION INTRAVENOUS CONTINUOUS
Status: DISCONTINUED | OUTPATIENT
Start: 2024-02-29 | End: 2024-03-01 | Stop reason: HOSPADM

## 2024-02-29 RX ORDER — LORAZEPAM 1 MG/1
1 TABLET ORAL
Status: DISCONTINUED | OUTPATIENT
Start: 2024-02-29 | End: 2024-03-01 | Stop reason: HOSPADM

## 2024-02-29 RX ORDER — LORAZEPAM 2 MG/ML
2 INJECTION INTRAMUSCULAR
Status: DISCONTINUED | OUTPATIENT
Start: 2024-02-29 | End: 2024-03-01 | Stop reason: HOSPADM

## 2024-02-29 RX ORDER — LANOLIN ALCOHOL/MO/W.PET/CERES
100 CREAM (GRAM) TOPICAL DAILY
Status: DISCONTINUED | OUTPATIENT
Start: 2024-02-29 | End: 2024-03-01 | Stop reason: HOSPADM

## 2024-02-29 RX ORDER — LEVETIRACETAM 500 MG/5ML
1500 INJECTION, SOLUTION, CONCENTRATE INTRAVENOUS ONCE
Status: COMPLETED | OUTPATIENT
Start: 2024-02-29 | End: 2024-02-29

## 2024-02-29 RX ORDER — DEXTROSE MONOHYDRATE 25 G/50ML
25 INJECTION, SOLUTION INTRAVENOUS ONCE
Status: COMPLETED | OUTPATIENT
Start: 2024-02-29 | End: 2024-02-29

## 2024-02-29 RX ORDER — ENOXAPARIN SODIUM 100 MG/ML
40 INJECTION SUBCUTANEOUS DAILY
Status: DISCONTINUED | OUTPATIENT
Start: 2024-03-01 | End: 2024-03-01 | Stop reason: HOSPADM

## 2024-02-29 RX ORDER — LORAZEPAM 2 MG/ML
1 INJECTION INTRAMUSCULAR
Status: DISCONTINUED | OUTPATIENT
Start: 2024-02-29 | End: 2024-03-01 | Stop reason: HOSPADM

## 2024-02-29 RX ORDER — MAGNESIUM SULFATE IN WATER 40 MG/ML
2000 INJECTION, SOLUTION INTRAVENOUS PRN
Status: DISCONTINUED | OUTPATIENT
Start: 2024-02-29 | End: 2024-03-01 | Stop reason: HOSPADM

## 2024-02-29 RX ORDER — ONDANSETRON 4 MG/1
4 TABLET, ORALLY DISINTEGRATING ORAL EVERY 8 HOURS PRN
Status: DISCONTINUED | OUTPATIENT
Start: 2024-02-29 | End: 2024-03-01 | Stop reason: HOSPADM

## 2024-02-29 RX ADMIN — DEXTROSE MONOHYDRATE 25 G: 25 INJECTION, SOLUTION INTRAVENOUS at 22:06

## 2024-02-29 RX ADMIN — DEXTROSE AND SODIUM CHLORIDE: 5; 900 INJECTION, SOLUTION INTRAVENOUS at 23:04

## 2024-02-29 RX ADMIN — LORAZEPAM 2 MG: 2 INJECTION INTRAMUSCULAR; INTRAVENOUS at 18:00

## 2024-02-29 RX ADMIN — LEVETIRACETAM 1500 MG: 100 INJECTION INTRAVENOUS at 15:55

## 2024-02-29 NOTE — ED PROVIDER NOTES
Department of Emergency Medicine   ED Provider Note  Admit Date/RoomTime: 2/29/2024  3:37 PM  ED Room: JOHNNA E/TIFFANY          History of Present Illness:  2/29/24, Time: 3:44 PM ALFIE Ospina is a 38 y.o. male presenting to the ED for seizures. Reportedly planning to go to rehab today so he thought he was supposed to drink more before he went so last night he reports drinking a lot of alcohol, EMS reports partner said he drink a liter of alcohol.  He notes he does have a history of seizures and takes Depakote.  Notes he did not miss any doses lately.  Denies any pain at this time.    Additional history obtained from EMS    Review of Systems:     Pertinent positives and negatives are stated within HPI.      --------------------------------------------- PAST HISTORY ---------------------------------------------  Past Medical History:  has a past medical history of Arthritis, Asthma, Gross hematuria, HIV (human immunodeficiency virus infection) (HCC), Methamphetamine abuse in remission (HCC), Migraine, Primary hypertension, Psychiatric problem, Recurrent major depressive disorder, in partial remission (HCC), Seizures (HCC), Severe alcohol use disorder (HCC), and Ureteral calculus, left.    Past Surgical History:  has a past surgical history that includes Colonoscopy; Abdomen surgery; Cystocopy (N/A, 05/09/2017); Ureteroscopy (Left, 06/09/2017); other surgical history; Neck surgery; Tonsillectomy; polypectomy; and other surgical history.    Social History:  reports that he has been smoking cigarettes. He started smoking about 16 years ago. He has a 4.2 pack-year smoking history. He has never used smokeless tobacco. He reports that he does not currently use alcohol. He reports that he does not currently use drugs.    Family History: family history includes Cancer in his father and mother; Diabetes in his father; Hypertension in his father and mother; Stroke in his father.     The patient’s home medications have been  INTERNAL MEDICINE     See ED COURSE    Disposition Considerations (Tests not ordered but considered, Shared Decision Making, Pt Expectation of Test or Tx.): Spoke with patient plans for testing and treatment.  He is understanding and agreeable to plan    Diagnoses considered include (but not limited to): Seizure vs EtOH withdrawal vs intoxication vs electrolyte abnormality, etc.     See ED COURSE for additional MDM. Labs & imaging reviewed and interpreted, see RESULTS above.     Re-Evaluations:           See ED Course above.       This patient's ED course included: a personal history and physicial examination, multiple bedside re-evaluations, and IV medications    This patient has remained hemodynamically stable during their ED course.      Consultations:  See ED Course    Counseling:   The emergency physician has spoken with the patient and discussed today’s results, in addition to providing specific details for the plan of care and counseling regarding the diagnosis and prognosis.  Questions are answered at this time and they are agreeable with the plan.       --------------------------------- IMPRESSION AND DISPOSITION ---------------------------------    IMPRESSION  1. Seizure (HCC)    2. Alcoholic ketosis (HCC)        DISPOSITION  Disposition: Admit to telemetry  Patient condition is stable    NOTE: This report was transcribed using voice recognition software. Every effort was made to ensure accuracy; however, inadvertent computerized transcription errors may be present.    Also please note that patient was seen and examined by attending physician. Plan of care and disposition discussed with attending physician and they were immediately available or present for all procedures performed.       -- Julia Alex D.O. PGY-2     Emergency Medicine      3/1/2024 12:28 AM

## 2024-03-01 ENCOUNTER — APPOINTMENT (OUTPATIENT)
Dept: NEUROLOGY | Age: 39
End: 2024-03-01
Payer: MEDICAID

## 2024-03-01 VITALS
RESPIRATION RATE: 20 BRPM | HEIGHT: 64 IN | TEMPERATURE: 98.2 F | BODY MASS INDEX: 22.2 KG/M2 | OXYGEN SATURATION: 99 % | DIASTOLIC BLOOD PRESSURE: 125 MMHG | SYSTOLIC BLOOD PRESSURE: 157 MMHG | WEIGHT: 130 LBS | HEART RATE: 83 BPM

## 2024-03-01 LAB
ALBUMIN SERPL-MCNC: 4.4 G/DL (ref 3.5–5.2)
ALP SERPL-CCNC: 63 U/L (ref 40–129)
ALT SERPL-CCNC: 28 U/L (ref 0–40)
AMPHET UR QL SCN: NEGATIVE
ANION GAP SERPL CALCULATED.3IONS-SCNC: 14 MMOL/L (ref 7–16)
AST SERPL-CCNC: 38 U/L (ref 0–39)
BARBITURATES UR QL SCN: NEGATIVE
BENZODIAZ UR QL: POSITIVE
BILIRUB SERPL-MCNC: 0.8 MG/DL (ref 0–1.2)
BILIRUB UR QL STRIP: NEGATIVE
BUN SERPL-MCNC: 18 MG/DL (ref 6–20)
BUPRENORPHINE UR QL: NEGATIVE
CALCIUM SERPL-MCNC: 8.9 MG/DL (ref 8.6–10.2)
CANNABINOIDS UR QL SCN: POSITIVE
CHLORIDE SERPL-SCNC: 104 MMOL/L (ref 98–107)
CHOLEST SERPL-MCNC: 172 MG/DL
CLARITY UR: CLEAR
CO2 SERPL-SCNC: 24 MMOL/L (ref 22–29)
COCAINE UR QL SCN: NEGATIVE
COLOR UR: YELLOW
CREAT SERPL-MCNC: 0.9 MG/DL (ref 0.7–1.2)
EKG ATRIAL RATE: 84 BPM
EKG P AXIS: 64 DEGREES
EKG P-R INTERVAL: 134 MS
EKG Q-T INTERVAL: 414 MS
EKG QRS DURATION: 104 MS
EKG QTC CALCULATION (BAZETT): 489 MS
EKG R AXIS: 72 DEGREES
EKG T AXIS: 39 DEGREES
EKG VENTRICULAR RATE: 84 BPM
ERYTHROCYTE [DISTWIDTH] IN BLOOD BY AUTOMATED COUNT: 12.1 % (ref 11.5–15)
FENTANYL UR QL: NEGATIVE
GFR SERPL CREATININE-BSD FRML MDRD: >60 ML/MIN/1.73M2
GLUCOSE BLD-MCNC: 111 MG/DL (ref 74–99)
GLUCOSE SERPL-MCNC: 115 MG/DL (ref 74–99)
GLUCOSE UR STRIP-MCNC: 100 MG/DL
HBA1C MFR BLD: 5.6 % (ref 4–5.6)
HCT VFR BLD AUTO: 39.3 % (ref 37–54)
HDLC SERPL-MCNC: 70 MG/DL
HGB BLD-MCNC: 13.3 G/DL (ref 12.5–16.5)
HGB UR QL STRIP.AUTO: NEGATIVE
KETONES UR STRIP-MCNC: 15 MG/DL
LDLC SERPL CALC-MCNC: 85 MG/DL
LEUKOCYTE ESTERASE UR QL STRIP: NEGATIVE
MAGNESIUM SERPL-MCNC: 2.2 MG/DL (ref 1.6–2.6)
MCH RBC QN AUTO: 32.6 PG (ref 26–35)
MCHC RBC AUTO-ENTMCNC: 33.8 G/DL (ref 32–34.5)
MCV RBC AUTO: 96.3 FL (ref 80–99.9)
METHADONE UR QL: NEGATIVE
NITRITE UR QL STRIP: NEGATIVE
OPIATES UR QL SCN: NEGATIVE
OXYCODONE UR QL SCN: NEGATIVE
PCP UR QL SCN: NEGATIVE
PH UR STRIP: 6 [PH] (ref 5–9)
PHOSPHATE SERPL-MCNC: 2.2 MG/DL (ref 2.5–4.5)
PLATELET # BLD AUTO: 190 K/UL (ref 130–450)
PMV BLD AUTO: 10.6 FL (ref 7–12)
POTASSIUM SERPL-SCNC: 4.3 MMOL/L (ref 3.5–5)
PROT SERPL-MCNC: 6.6 G/DL (ref 6.4–8.3)
PROT UR STRIP-MCNC: NEGATIVE MG/DL
RBC # BLD AUTO: 4.08 M/UL (ref 3.8–5.8)
RBC #/AREA URNS HPF: ABNORMAL /HPF
SODIUM SERPL-SCNC: 142 MMOL/L (ref 132–146)
SP GR UR STRIP: 1.02 (ref 1–1.03)
TEST INFORMATION: ABNORMAL
TRIGL SERPL-MCNC: 84 MG/DL
TSH SERPL DL<=0.05 MIU/L-ACNC: 1.77 UIU/ML (ref 0.27–4.2)
UROBILINOGEN UR STRIP-ACNC: >8 EU/DL (ref 0–1)
VLDLC SERPL CALC-MCNC: 17 MG/DL
WBC #/AREA URNS HPF: ABNORMAL /HPF
WBC OTHER # BLD: 6 K/UL (ref 4.5–11.5)

## 2024-03-01 PROCEDURE — 83735 ASSAY OF MAGNESIUM: CPT

## 2024-03-01 PROCEDURE — 81001 URINALYSIS AUTO W/SCOPE: CPT

## 2024-03-01 PROCEDURE — 85027 COMPLETE CBC AUTOMATED: CPT

## 2024-03-01 PROCEDURE — 93010 ELECTROCARDIOGRAM REPORT: CPT | Performed by: INTERNAL MEDICINE

## 2024-03-01 PROCEDURE — 82962 GLUCOSE BLOOD TEST: CPT

## 2024-03-01 PROCEDURE — 80061 LIPID PANEL: CPT

## 2024-03-01 PROCEDURE — 6370000000 HC RX 637 (ALT 250 FOR IP)

## 2024-03-01 PROCEDURE — 6360000002 HC RX W HCPCS: Performed by: INTERNAL MEDICINE

## 2024-03-01 PROCEDURE — 84443 ASSAY THYROID STIM HORMONE: CPT

## 2024-03-01 PROCEDURE — 6370000000 HC RX 637 (ALT 250 FOR IP): Performed by: INTERNAL MEDICINE

## 2024-03-01 PROCEDURE — 95816 EEG AWAKE AND DROWSY: CPT

## 2024-03-01 PROCEDURE — 99239 HOSP IP/OBS DSCHRG MGMT >30: CPT | Performed by: INTERNAL MEDICINE

## 2024-03-01 PROCEDURE — 84100 ASSAY OF PHOSPHORUS: CPT

## 2024-03-01 PROCEDURE — 80053 COMPREHEN METABOLIC PANEL: CPT

## 2024-03-01 PROCEDURE — 83036 HEMOGLOBIN GLYCOSYLATED A1C: CPT

## 2024-03-01 PROCEDURE — 80307 DRUG TEST PRSMV CHEM ANLYZR: CPT

## 2024-03-01 RX ORDER — LEVETIRACETAM 500 MG/1
500 TABLET ORAL 2 TIMES DAILY
Qty: 60 TABLET | Refills: 3 | Status: SHIPPED | OUTPATIENT
Start: 2024-03-01 | End: 2024-03-01

## 2024-03-01 RX ORDER — TRAZODONE HYDROCHLORIDE 50 MG/1
150 TABLET ORAL NIGHTLY PRN
Status: DISCONTINUED | OUTPATIENT
Start: 2024-03-01 | End: 2024-03-01 | Stop reason: HOSPADM

## 2024-03-01 RX ORDER — ALBUTEROL SULFATE 90 UG/1
2 AEROSOL, METERED RESPIRATORY (INHALATION) EVERY 6 HOURS PRN
COMMUNITY

## 2024-03-01 RX ORDER — VENLAFAXINE HYDROCHLORIDE 75 MG/1
75 CAPSULE, EXTENDED RELEASE ORAL DAILY
Status: ON HOLD | COMMUNITY
End: 2024-03-01

## 2024-03-01 RX ORDER — LEVETIRACETAM 500 MG/1
500 TABLET ORAL 2 TIMES DAILY
Qty: 60 TABLET | Refills: 3 | Status: SHIPPED | OUTPATIENT
Start: 2024-03-01

## 2024-03-01 RX ORDER — LISINOPRIL 5 MG/1
5 TABLET ORAL DAILY
Qty: 90 TABLET | Refills: 3 | Status: SHIPPED | OUTPATIENT
Start: 2024-03-01

## 2024-03-01 RX ORDER — VENLAFAXINE HYDROCHLORIDE 37.5 MG/1
37.5 CAPSULE, EXTENDED RELEASE ORAL DAILY
Qty: 30 CAPSULE | Refills: 3 | Status: SHIPPED | OUTPATIENT
Start: 2024-03-02

## 2024-03-01 RX ORDER — VENLAFAXINE HYDROCHLORIDE 37.5 MG/1
37.5 CAPSULE, EXTENDED RELEASE ORAL DAILY
Status: DISCONTINUED | OUTPATIENT
Start: 2024-03-01 | End: 2024-03-01 | Stop reason: HOSPADM

## 2024-03-01 RX ORDER — FOLIC ACID 1 MG/1
1 TABLET ORAL DAILY
Status: DISCONTINUED | OUTPATIENT
Start: 2024-03-01 | End: 2024-03-01 | Stop reason: HOSPADM

## 2024-03-01 RX ORDER — LEVETIRACETAM 500 MG/1
500 TABLET ORAL 2 TIMES DAILY
Status: DISCONTINUED | OUTPATIENT
Start: 2024-03-01 | End: 2024-03-01 | Stop reason: HOSPADM

## 2024-03-01 RX ORDER — DIVALPROEX SODIUM 500 MG/1
500 TABLET, DELAYED RELEASE ORAL 2 TIMES DAILY
Status: DISCONTINUED | OUTPATIENT
Start: 2024-03-01 | End: 2024-03-01 | Stop reason: HOSPADM

## 2024-03-01 RX ORDER — LISINOPRIL 5 MG/1
5 TABLET ORAL DAILY
Qty: 90 TABLET | Refills: 3 | Status: SHIPPED | OUTPATIENT
Start: 2024-03-01 | End: 2024-03-01

## 2024-03-01 RX ORDER — VENLAFAXINE HYDROCHLORIDE 37.5 MG/1
37.5 CAPSULE, EXTENDED RELEASE ORAL DAILY
Qty: 30 CAPSULE | Refills: 3 | Status: SHIPPED | OUTPATIENT
Start: 2024-03-02 | End: 2024-03-01

## 2024-03-01 RX ORDER — DICYCLOMINE HCL 20 MG
20 TABLET ORAL 2 TIMES DAILY PRN
Status: ON HOLD | COMMUNITY
End: 2024-03-01

## 2024-03-01 RX ORDER — LISINOPRIL 10 MG/1
5 TABLET ORAL DAILY
Status: DISCONTINUED | OUTPATIENT
Start: 2024-03-01 | End: 2024-03-01 | Stop reason: HOSPADM

## 2024-03-01 RX ADMIN — LISINOPRIL 5 MG: 10 TABLET ORAL at 09:07

## 2024-03-01 RX ADMIN — BICTEGRAVIR SODIUM, EMTRICITABINE, AND TENOFOVIR ALAFENAMIDE FUMARATE 1 TABLET: 50; 200; 25 TABLET ORAL at 10:33

## 2024-03-01 RX ADMIN — Medication 100 MG: at 09:06

## 2024-03-01 RX ADMIN — VENLAFAXINE HYDROCHLORIDE 37.5 MG: 37.5 CAPSULE, EXTENDED RELEASE ORAL at 09:06

## 2024-03-01 RX ADMIN — LEVETIRACETAM 500 MG: 500 TABLET, FILM COATED ORAL at 09:07

## 2024-03-01 RX ADMIN — DIVALPROEX SODIUM 500 MG: 500 TABLET, DELAYED RELEASE ORAL at 09:07

## 2024-03-01 RX ADMIN — METOPROLOL TARTRATE 25 MG: 25 TABLET, FILM COATED ORAL at 09:07

## 2024-03-01 RX ADMIN — FOLIC ACID 1 MG: 1 TABLET ORAL at 09:07

## 2024-03-01 RX ADMIN — ENOXAPARIN SODIUM 40 MG: 100 INJECTION SUBCUTANEOUS at 09:06

## 2024-03-01 ASSESSMENT — PAIN SCALES - GENERAL
PAINLEVEL_OUTOF10: 0
PAINLEVEL_OUTOF10: 0

## 2024-03-01 NOTE — PROGRESS NOTES
4 Eyes Skin Assessment     NAME:  Alfonso Ospina  YOB: 1985  MEDICAL RECORD NUMBER:  [id number]    The patient is being assessed for  Admission    I agree that at least one RN has performed a thorough Head to Toe Skin Assessment on the patient. ALL assessment sites listed below have been assessed.      Areas assessed by both nurses:    Other Patient refused skin assessment, patient independent, states no open areas.        Does the Patient have a Wound? No noted wound(s)       Vj Prevention initiated by RN: No  Wound Care Orders initiated by RN: No    Pressure Injury (Stage 3,4, Unstageable, DTI, NWPT, and Complex wounds) if present, place Wound referral order by RN under : No    New Ostomies, if present place, Ostomy referral order under : No     Nurse 1 eSignature: Electronically signed by Nicolasa Putnam RN on 3/1/24 at 1:17 PM EST    **SHARE this note so that the co-signing nurse can place an eSignature**    Nurse 2 eSignature: {Esignature:[id number]}

## 2024-03-01 NOTE — H&P
Inpatient H&P      PCP:  Carli Maki PA  Admitting Physician:  No admitting provider for patient encounter.  Consultants:  None at this time   Chief Complaint:    Chief Complaint   Patient presents with    Seizures     Having seizures. From home. Per EMS 5 versed IM given PTA        History of Present Illness  Alfonso Ospina is a 38 y.o. male who presents to [facility] ER complaining of seizures.    Alfonso Ospina has a past medical history that includes Arthritis, Asthma, Gross hematuria, HIV (human immunodeficiency virus infection) (HCC), Methamphetamine abuse in remission (HCC), Migraine, Primary hypertension, Psychiatric problem, Recurrent major depressive disorder, in partial remission (HCC), Seizures (HCC), Severe alcohol use disorder (HCC), and Ureteral calculus, left.     Alfonso presents to the ER with seizures.  He has apparently has been drinking alcohol more frequently.  He was supposed to go to rehab today and he thought he was supposed to drink more before he goes to rehab.  He then had a seizure and EMS was called.  He does have a history of seizures and is on Depakote.  He states he drank a gallon of vodka. During my exam, he is saying he wants to go home. SURESH risk.      Discussed patient's case with ED physician.    ER Course  Upon presentation to the ER, routine labwork was performed which revealed AST 57, glucose 68, ethanol 282.  Imaging results are as outlined below in the Imaging section of this note.  EKG revealed NSR.  Upon arrival to the ER, patient was 111/73.  The patient received Keppra, Ativan, D50 in the emergency room and was admitted to [facility].    Last Hospital Admission - 8/27/21  None in EMR    Last Echocardiogram - 1/9/20  1. Normal echocardiogram.  2. Left ventricle: Systolic function is normal by the biplane method of  disks. The estimated ejection fraction is 74%. There are no regional  wall motion abnormalities. Left ventricular diastolic function  parameters are  weight based adjustment of the mA/kV was utilized to reduce the radiation dose to as low as reasonably achievable. COMPARISON: 10/09/2023 HISTORY: ORDERING SYSTEM PROVIDED HISTORY: Seizures; concern for ICH TECHNOLOGIST PROVIDED HISTORY: Has a \"code stroke\" or \"stroke alert\" been called?->No Reason for exam:->Seizures; concern for ICH Decision Support Exception - unselect if not a suspected or confirmed emergency medical condition->Emergency Medical Condition (MA) What reading provider will be dictating this exam?->CRC FINDINGS: BRAIN/VENTRICLES: There is no acute intracranial hemorrhage, mass effect or midline shift.  No abnormal extra-axial fluid collection.  The gray-white differentiation is maintained without evidence of an acute infarct.  There is no evidence of hydrocephalus. A cavum septi pellucidi et vergae is incidentally noted. ORBITS: The visualized portion of the orbits demonstrate no acute abnormality. SINUSES: The visualized paranasal sinuses and mastoid air cells demonstrate no acute abnormality. SOFT TISSUES/SKULL:  No acute abnormality of the visualized skull or soft tissues.     No acute intracranial abnormality.           Assessment and Plan  Patient is a 38 y.o. male who presented with seizure.   The active problem list is as follows:    Breakthrough seizure, with history of previous seizures- seizure precautions. Had keppra in ER. Neuro consult.   Alcohol use disorder, alcohol intoxication- ethanol 282. CIWA. Thiamine and folate. Needs alcohol rehab  HIV- on biktarvy  History polysubstance abuse- UDS pending  Depression  AMA risk      Routine labs in the morning.  DVT prophylaxis.  Please see orders for further management and care.  If there are any questions after 7am, [facility].      Additional work up or/and treatment plan may be added today or thereafter based on clinical progression. I am managing admission portion of patient care. Some medical

## 2024-03-01 NOTE — CONSULTS
Monocytes % 8 2.0 - 12.0 %    Eosinophils % 0 0 - 6 %    Basophils % 1 0.0 - 2.0 %    Immature Granulocytes 0 0.0 - 5.0 %    Neutrophils Absolute 3.96 1.80 - 7.30 k/uL    Lymphocytes Absolute 3.25 1.50 - 4.00 k/uL    Monocytes Absolute 0.64 0.10 - 0.95 k/uL    Eosinophils Absolute 0.01 (L) 0.05 - 0.50 k/uL    Basophils Absolute 0.06 0.00 - 0.20 k/uL    Absolute Immature Granulocyte <0.03 0.00 - 0.58 k/uL   CMP    Collection Time: 02/29/24  3:47 PM   Result Value Ref Range    Sodium 142 132 - 146 mmol/L    Potassium 3.6 3.5 - 5.0 mmol/L    Chloride 99 98 - 107 mmol/L    CO2 24 22 - 29 mmol/L    Anion Gap 19 (H) 7 - 16 mmol/L    Glucose 68 (L) 74 - 99 mg/dL    BUN 24 (H) 6 - 20 mg/dL    Creatinine 1.1 0.70 - 1.20 mg/dL    Est, Glom Filt Rate >60 >60 mL/min/1.73m2    Calcium 8.9 8.6 - 10.2 mg/dL    Total Protein 6.7 6.4 - 8.3 g/dL    Albumin 4.4 3.5 - 5.2 g/dL    Total Bilirubin 0.4 0.0 - 1.2 mg/dL    Alkaline Phosphatase 64 40 - 129 U/L    ALT 31 0 - 40 U/L    AST 57 (H) 0 - 39 U/L   Valproic Acid Level, Total    Collection Time: 02/29/24  3:47 PM   Result Value Ref Range    Valproic Acid Lvl 83 50 - 100 ug/mL   SERUM DRUG SCREEN    Collection Time: 02/29/24  3:47 PM   Result Value Ref Range    Acetaminophen Level <5 (L) 10.0 - 30.0 ug/mL    Ethanol 282 (H) <10 mg/dL    Salicylate Lvl <0.3 0.0 - 30.0 mg/dL    Toxic Tricyclic Sc,Blood NEGATIVE NEGATIVE   EKG 12 Lead    Collection Time: 02/29/24  4:11 PM   Result Value Ref Range    Ventricular Rate 84 BPM    Atrial Rate 84 BPM    P-R Interval 134 ms    QRS Duration 104 ms    Q-T Interval 414 ms    QTc Calculation (Bazett) 489 ms    P Axis 64 degrees    R Axis 72 degrees    T Axis 39 degrees   Urinalysis with Microscopic    Collection Time: 03/01/24  2:40 AM   Result Value Ref Range    Color, UA Yellow Yellow    Turbidity UA Clear Clear    Glucose, Ur 100 (A) NEGATIVE mg/dL    Bilirubin Urine NEGATIVE NEGATIVE    Ketones, Urine 15 (A) NEGATIVE mg/dL    Specific Gravity,

## 2024-03-01 NOTE — PROCEDURES
EEG Report  Alfonso Ospina is a 38 y.o. male      Appointment Date 3/1/2024   Appointment Time  7:30am   Facility Location [facility] EEG Number [id number]   Type of Study Routine Floor ER-rodriguez     Technical Specifications  Technician Reny Guan     State of consciousness Awake/restless   Sleep deprived? no   Hyperventilation tested? no   Photic stim tested? yes   EEG recording Standard 10-20 electrode placement    Duration of recording 27 mins   EEG complete? yes       Clinical History   Alfonso Ospina is a 38 y.o. male  with h/o Seizures, HIV (human immunodeficiency virus infection), Migraine, HTN, Recurrent major depressive disorder, Methamphetamine abuse in remission, Severe alcohol use disorder, and Arthritis  who was admitted to [facility] on 2/29/2024 with presentation of seizure activity.  Patient with report of recent increased alcohol consumption since he was to be placed in Rehab for his alcoholism on the day of presentation. He had apparently drank a gallon of Vodka and has a seizure event with EMS called for evaluation and transport to the ED. Patient had been on Depakote 500 mg BID prior to presentation and stated sandeep he had been compliant with his medication. On ED evaluation he was loaded on Keppra 1500 mg IV before starting on a maintenance dosing of 500 mg BID. Patient was also continued on his Depakote as before. No report of seizure activity since hospitalization. Patient does have a h/o alcohol abuse and there is a concern for possible withdrawal and was therefore started on a CIWA protocol. CT of head wsa completed with no acute abnormalities on the study.     Medications    Current Facility-Administered Medications:     metoprolol tartrate (LOPRESSOR) tablet 25 mg, 25 mg, Oral, BID, Nava Melendez DO    bictegravir-emtricitab-tenofovir alafenamide (BIKTARVY) -25 MG per tablet 1 tablet, 1 tablet, Oral, Daily, Nava Melendez DO    divalproex (DEPAKOTE) DR tablet 500 mg, 500 mg, Oral,  Sulfate (NURTEC) 75 MG TBDP, Take 75 mg by mouth as needed (migrianes) (Patient not taking: Reported on 2/14/2024), Disp: , Rfl:     naproxen (NAPROSYN) 500 MG tablet, Take 1 tablet by mouth 2 times daily as needed for Pain, Disp: 28 tablet, Rfl: 0    neomycin-bacitracin-polymyxin (NEOSPORIN) 5-400-5000 ointment, Apply topically 4 times daily., Disp: 1 each, Rfl: 0    bictegravir-emtricitab-tenofovir alafenamide (BIKTARVY) -25 MG TABS per tablet, Take 1 tablet by mouth daily, Disp: 30 tablet, Rfl: 5    venlafaxine (EFFEXOR XR) 37.5 MG extended release capsule, Take 1 capsule by mouth daily, Disp: 30 capsule, Rfl: 3    rizatriptan (MAXALT) 10 MG tablet, Take 1 tablet by mouth once as needed for Migraine May repeat in 2 hours if needed, Disp: 9 tablet, Rfl: 1    traZODone (DESYREL) 150 MG tablet, Take 1 tablet by mouth nightly as needed for Sleep, Disp: 30 tablet, Rfl: 1    loratadine (CLARITIN) 10 MG tablet, Take 1 tablet by mouth daily, Disp: 90 tablet, Rfl: 3    dicyclomine (BENTYL) 10 MG capsule, Take 1 capsule by mouth 4 times daily (before meals and nightly) (Patient taking differently: Take 2 capsules by mouth 4 times daily (before meals and nightly)), Disp: 120 capsule, Rfl: 1    omeprazole (PRILOSEC) 20 MG delayed release capsule, Take 1 capsule by mouth 2 times daily (before meals) (Patient not taking: Reported on 10/9/2023), Disp: 60 capsule, Rfl: 0    ANTIFUNGAL CLOTRIMAZOLE 1 % cream, , Disp: , Rfl:     acetaminophen (TYLENOL) 325 MG tablet, Take by mouth (Patient not taking: Reported on 10/9/2023), Disp: , Rfl:     VIVITROL 380 MG injection, INJECT 4 MILLILITERS INTO THE MUSCLE ONCE A MONTH (Patient not taking: Reported on 10/6/2022), Disp: , Rfl:     acebutolol (SECTRAL) 200 MG capsule, Take 1 capsule by mouth in the morning and 1 capsule before bedtime., Disp: 60 capsule, Rfl: 5    divalproex (DEPAKOTE) 500 MG DR tablet, Take 1 tablet by mouth in the morning and 1 tablet before bedtime., Disp:

## 2024-03-01 NOTE — PLAN OF CARE
Problem: Discharge Planning  Goal: Discharge to home or other facility with appropriate resources  Outcome: Completed  Flowsheets (Taken 3/1/2024 1158)  Discharge to home or other facility with appropriate resources:   Identify barriers to discharge with patient and caregiver   Identify discharge learning needs (meds, wound care, etc)   Refer to discharge planning if patient needs post-hospital services based on physician order or complex needs related to functional status, cognitive ability or social support system   Arrange for needed discharge resources and transportation as appropriate   Arrange for interpreters to assist at discharge as needed     Problem: Risk for Elopement  Goal: Patient will not exit the unit/facility without proper excort  Outcome: Completed     Problem: Safety - Adult  Goal: Free from fall injury  Outcome: Completed     Problem: Pain  Goal: Verbalizes/displays adequate comfort level or baseline comfort level  Outcome: Completed

## 2024-03-01 NOTE — CONSULTS
Patient with report of seizure activity every 3 months. Patient with report of typical stress induced seizure. Patient stated that he had seizures in last 2 days and could not remember last seizure before that. Stated that he had flu about 2 weeks ago with residual cough. Patient Edward that he was under increased stress with increased alcohol consumption over the last few days. Seizures started about 2-3 days ago with daily events. No seizures since admission. Keppra was added to his therapy regimen.

## 2024-03-01 NOTE — DISCHARGE INSTRUCTIONS
Discharge to:    Diet: regular  Activity: activity as tolerated   Exercise: As tolerated     Be compliant with medication  Started on Keppra to continue outpatient.  Follow-up with stroke

## 2024-03-01 NOTE — DISCHARGE SUMMARY
[facility] Hospitalist Physician Discharge Summary       Carli Maki PA  [address] [id number]  [phone]    Call  hospital follow up      Activity level: As tolerated     Dispo: Home      Condition on discharge: Stable     Patient ID:  Alfonso Ospina  [id number]  38 y.o.  1985    Admit date: 2/29/2024    Discharge date and time:  3/1/2024  3:16 PM    Admission Diagnoses: Principal Problem:    Seizure (HCC)  Active Problems:    Primary hypertension    Asthma  Resolved Problems:    * No resolved hospital problems. *      Discharge Diagnoses: Principal Problem:    Seizure (HCC)  Active Problems:    Primary hypertension    Asthma  Resolved Problems:    * No resolved hospital problems. *      Consults:  IP CONSULT TO SOCIAL WORK  IP CONSULT TO INTERNAL MEDICINE  IP CONSULT TO NEUROLOGY    Hospital Course:   Patient Alfonso Ospina is a 38 y.o. presented with Seizure (HCC) [R56.9]  Alcoholic ketosis (HCC) [E88.89]    Patient is a 38-year-old male who presents to the ER after having seizures at home, EMS administered 5 mg Versed IM and route.  Patient has been drinking alcohol more frequently, was supposed to go to rehab on date of presentation and he thought he was for surgery tomorrow before he goes to rehab.  He did have a seizure and called EMS.  Patient has a history of seizures, on Depakote.  Patient states he drank a gallon of vodka prior to coming in.  In the ED, hypoglycemia with glucose 68, AST 57, ethanol level 282.  EKG with normal sinus rhythm.  Patient was given Keppra, D50 in the ED and admitted.  Neurology consulted and recommended continuing Keppra in addition to Depakote.  EEG done and was unremarkable.  Patient stable for discharge home.  Patient will follow-up with neurology outpatient.    Discharge Exam:    General Appearance: alert and oriented to person, place and time and in no acute distress  Skin: warm and dry  Head: normocephalic and atraumatic  Eyes: pupils

## 2024-03-01 NOTE — CONSULTS
NEUROLOGY CONSULT NOTE      Requesting Physician:  Nava Melendez DO    Reason for Consult:  Evaluate for seizures.     History of Present Illness:  Alfonso Ospina is a 38 y.o. male  with h/o Epilepsy, HIV (human immunodeficiency virus infection), Migraine, HTN, Recurrent major depressive disorder, Methamphetamine abuse in remission, Severe alcohol use disorder, and Arthritis  who was admitted to [facility] on 2/29/2024 with presentation of seizure activity.  Patient with report of recent increased alcohol consumption since he was to be placed in Rehab for his alcoholism on the day of presentation. He had apparently drank a gallon of Vodka and has a seizure event with EMS called for evaluation and transport to the ED. Patient with report of seizure activity every 3 months that is typically stress induced. He stated that he had seizures in last 2 days and could not remember last seizure before that. Stated that he had flu about 2 weeks ago with ongoing residual cough. Increased stress was the trigger for his  increased alcohol consumption over the last few days. Seizures started about 2-3 days ago with daily events. Patient had been on Depakote 500 mg BID prior to presentation and stated sandeep he had been compliant with his medication. On ED evaluation he was loaded on Keppra 1500 mg IV before starting on a maintenance dosing of 500 mg BID. Patient was also continued on his Depakote as before. No report of seizure activity since hospitalization. Patient does have a h/o alcohol abuse and there is a concern for possible withdrawal and was therefore started on a CIWA protocol. CT of head wsa completed with no acute abnormalities on the study. EEG study was also completed today with note of no acute intracranial abnormalities.       Past Medical History:        Diagnosis Date    Arthritis     Asthma     Gross hematuria 08/31/2021    HIV (human immunodeficiency virus infection) ([facility])     Methamphetamine abuse in  mmol/L    CO2 24 22 - 29 mmol/L    Anion Gap 14 7 - 16 mmol/L    Glucose 115 (H) 74 - 99 mg/dL    BUN 18 6 - 20 mg/dL    Creatinine 0.9 0.70 - 1.20 mg/dL    Est, Glom Filt Rate >60 >60 mL/min/1.73m2    Calcium 8.9 8.6 - 10.2 mg/dL    Total Protein 6.6 6.4 - 8.3 g/dL    Albumin 4.4 3.5 - 5.2 g/dL    Total Bilirubin 0.8 0.0 - 1.2 mg/dL    Alkaline Phosphatase 63 40 - 129 U/L    ALT 28 0 - 40 U/L    AST 38 0 - 39 U/L   CBC    Collection Time: 03/01/24  9:21 AM   Result Value Ref Range    WBC 6.0 4.5 - 11.5 k/uL    RBC 4.08 3.80 - 5.80 m/uL    Hemoglobin 13.3 12.5 - 16.5 g/dL    Hematocrit 39.3 37.0 - 54.0 %    MCV 96.3 80.0 - 99.9 fL    MCH 32.6 26.0 - 35.0 pg    MCHC 33.8 32.0 - 34.5 g/dL    RDW 12.1 11.5 - 15.0 %    Platelets 190 130 - 450 k/uL    MPV 10.6 7.0 - 12.0 fL       Radiology:  CT Head W/O Contrast    Result Date: 2/29/2024  EXAMINATION: CT OF THE HEAD WITHOUT CONTRAST  2/29/2024 5:51 pm TECHNIQUE: CT of the head was performed without the administration of intravenous contrast. Automated exposure control, iterative reconstruction, and/or weight based adjustment of the mA/kV was utilized to reduce the radiation dose to as low as reasonably achievable. COMPARISON: 10/09/2023 HISTORY: ORDERING SYSTEM PROVIDED HISTORY: Seizures; concern for ICH TECHNOLOGIST PROVIDED HISTORY: Has a \"code stroke\" or \"stroke alert\" been called?->No Reason for exam:->Seizures; concern for ICH Decision Support Exception - unselect if not a suspected or confirmed emergency medical condition->Emergency Medical Condition (MA) What reading provider will be dictating this exam?->CRC FINDINGS: BRAIN/VENTRICLES: There is no acute intracranial hemorrhage, mass effect or midline shift.  No abnormal extra-axial fluid collection.  The gray-white differentiation is maintained without evidence of an acute infarct.  There is no evidence of hydrocephalus. A cavum septi pellucidi et vergae is incidentally noted. ORBITS: The visualized portion of

## 2024-03-25 ENCOUNTER — HOSPITAL ENCOUNTER (OUTPATIENT)
Age: 39
Discharge: HOME | End: 2024-03-25
Payer: MEDICAID

## 2024-03-25 LAB
25(OH)D3 SERPL-MCNC: 64.5 NG/ML (ref 30–100)
ALBUMIN SERPL-MCNC: 4.6 G/DL (ref 3.5–5.2)
ALP SERPL-CCNC: 71 U/L (ref 40–129)
ALT SERPL-CCNC: 15 U/L (ref 0–40)
ANION GAP SERPL CALCULATED.3IONS-SCNC: 13 MMOL/L (ref 7–16)
AST SERPL-CCNC: 16 U/L (ref 0–39)
BILIRUB SERPL-MCNC: 0.2 MG/DL (ref 0–1.2)
BUN SERPL-MCNC: 16 MG/DL (ref 6–20)
CALCIUM SERPL-MCNC: 9.9 MG/DL (ref 8.6–10.2)
CHLORIDE SERPL-SCNC: 104 MMOL/L (ref 98–107)
CHOLEST SERPL-MCNC: 172 MG/DL
CO2 SERPL-SCNC: 26 MMOL/L (ref 22–29)
CREAT SERPL-MCNC: 1.1 MG/DL (ref 0.7–1.2)
GFR SERPL CREATININE-BSD FRML MDRD: 85 ML/MIN/1.73M2
GLUCOSE SERPL-MCNC: 98 MG/DL (ref 74–99)
HDLC SERPL-MCNC: 75 MG/DL
LDLC SERPL CALC-MCNC: 75 MG/DL
POTASSIUM SERPL-SCNC: 4.1 MMOL/L (ref 3.5–5)
PROT SERPL-MCNC: 7 G/DL (ref 6.4–8.3)
SODIUM SERPL-SCNC: 143 MMOL/L (ref 132–146)
TRIGL SERPL-MCNC: 108 MG/DL
VLDLC SERPL CALC-MCNC: 22 MG/DL

## 2024-03-25 PROCEDURE — 86592 SYPHILIS TEST NON-TREP QUAL: CPT

## 2024-03-25 PROCEDURE — 86359 T CELLS TOTAL COUNT: CPT

## 2024-03-25 PROCEDURE — 86777 TOXOPLASMA ANTIBODY: CPT

## 2024-03-25 PROCEDURE — 80053 COMPREHEN METABOLIC PANEL: CPT

## 2024-03-25 PROCEDURE — 86317 IMMUNOASSAY INFECTIOUS AGENT: CPT

## 2024-03-25 PROCEDURE — 87536 HIV-1 QUANT&REVRSE TRNSCRPJ: CPT

## 2024-03-25 PROCEDURE — 80061 LIPID PANEL: CPT

## 2024-03-25 PROCEDURE — 81381 HLA I TYPING 1 ALLELE HR: CPT

## 2024-03-25 PROCEDURE — 86803 HEPATITIS C AB TEST: CPT

## 2024-03-25 PROCEDURE — 86360 T CELL ABSOLUTE COUNT/RATIO: CPT

## 2024-03-25 PROCEDURE — 86708 HEPATITIS A ANTIBODY: CPT

## 2024-03-25 PROCEDURE — 85025 COMPLETE CBC W/AUTO DIFF WBC: CPT

## 2024-03-25 PROCEDURE — 82306 VITAMIN D 25 HYDROXY: CPT

## 2024-03-25 PROCEDURE — 36415 COLL VENOUS BLD VENIPUNCTURE: CPT

## 2024-03-25 PROCEDURE — 86481 TB AG RESPONSE T-CELL SUSP: CPT

## 2024-03-26 LAB
BASOPHILS # BLD: 0.06 K/UL (ref 0–0.2)
BASOPHILS NFR BLD: 1 % (ref 0–2)
EOSINOPHIL # BLD: 0.1 K/UL (ref 0.05–0.5)
EOSINOPHILS RELATIVE PERCENT: 1 % (ref 0–6)
ERYTHROCYTE [DISTWIDTH] IN BLOOD BY AUTOMATED COUNT: 12.8 % (ref 11.5–15)
HBV SURFACE AB SERPL IA-ACNC: >1000 MIU/ML (ref 0–9.99)
HCT VFR BLD AUTO: 49 % (ref 37–54)
HCV AB SERPL QL IA: NONREACTIVE
HGB BLD-MCNC: 16.1 G/DL (ref 12.5–16.5)
IMM GRANULOCYTES # BLD AUTO: <0.03 K/UL (ref 0–0.58)
IMM GRANULOCYTES NFR BLD: 0 % (ref 0–5)
LYMPHOCYTES NFR BLD: 2.64 K/UL (ref 1.5–4)
LYMPHOCYTES RELATIVE PERCENT: 37 % (ref 20–42)
MCH RBC QN AUTO: 33.7 PG (ref 26–35)
MCHC RBC AUTO-ENTMCNC: 32.9 G/DL (ref 32–34.5)
MCV RBC AUTO: 102.5 FL (ref 80–99.9)
MONOCYTES NFR BLD: 1.09 K/UL (ref 0.1–0.95)
MONOCYTES NFR BLD: 15 % (ref 2–12)
NEUTROPHILS NFR BLD: 46 % (ref 43–80)
NEUTS SEG NFR BLD: 3.28 K/UL (ref 1.8–7.3)
PLATELET # BLD AUTO: 172 K/UL (ref 130–450)
PMV BLD AUTO: 11.3 FL (ref 7–12)
RBC # BLD AUTO: 4.78 M/UL (ref 3.8–5.8)
RPR SER QL: NONREACTIVE
WBC OTHER # BLD: 7.2 K/UL (ref 4.5–11.5)

## 2024-03-27 LAB
ANNOTATION COMMENT IMP: ABNORMAL
CD3 CELLS # BLD: 2979 CELLS/UL (ref 570–2400)
CD3+CD4+ CELLS # BLD: 1597 CELLS/UL (ref 430–1800)
CD3+CD4+ CELLS/CD3+CD8+ CLL BLD: 1.19 RATIO (ref 0.8–3.9)
CD3+CD8+ CELLS # BLD: 1339 CELLS/UL (ref 210–1200)
HAV AB SERPL IA-ACNC: REACTIVE
HIV1 RNA # SERPL NAA+PROBE: <20 CPY/ML
HIV1 RNA # SERPL NAA+PROBE: DETECTED {COPIES}/ML
HIV1 RNA SERPL NAA+PROBE-LOG#: <1.3 LOG CPY/ML
SPECIMEN SOURCE: ABNORMAL
T GONDII IGG SER-ACNC: NORMAL IU/ML

## 2024-03-28 LAB — T SPOT TB TEST: NORMAL

## 2024-03-31 LAB
HLA-B*57:01 QL: NORMAL
SPECIMEN SOURCE: NORMAL

## 2024-05-23 ENCOUNTER — TRANSCRIBE ORDERS (OUTPATIENT)
Dept: ADMINISTRATIVE | Age: 39
End: 2024-05-23

## 2024-05-23 DIAGNOSIS — G40.89 OTHER SEIZURES (HCC): Primary | ICD-10-CM

## 2024-05-24 ENCOUNTER — HOSPITAL ENCOUNTER (EMERGENCY)
Age: 39
Discharge: HOME | End: 2024-05-24
Attending: EMERGENCY MEDICINE
Payer: MEDICAID

## 2024-05-24 VITALS
TEMPERATURE: 98.4 F | DIASTOLIC BLOOD PRESSURE: 101 MMHG | OXYGEN SATURATION: 97 % | RESPIRATION RATE: 14 BRPM | HEART RATE: 62 BPM | SYSTOLIC BLOOD PRESSURE: 127 MMHG

## 2024-05-24 DIAGNOSIS — G40.919 BREAKTHROUGH SEIZURE (HCC): Primary | ICD-10-CM

## 2024-05-24 LAB
ALBUMIN SERPL-MCNC: 5.2 G/DL (ref 3.5–5.2)
ALP SERPL-CCNC: 73 U/L (ref 40–129)
ALT SERPL-CCNC: 37 U/L (ref 0–40)
AMPHET UR QL SCN: NEGATIVE
ANION GAP SERPL CALCULATED.3IONS-SCNC: 16 MMOL/L (ref 7–16)
APAP SERPL-MCNC: <5 UG/ML (ref 10–30)
AST SERPL-CCNC: 40 U/L (ref 0–39)
BARBITURATES UR QL SCN: NEGATIVE
BASOPHILS # BLD: 0.05 K/UL (ref 0–0.2)
BASOPHILS NFR BLD: 1 % (ref 0–2)
BENZODIAZ UR QL: POSITIVE
BILIRUB SERPL-MCNC: 0.3 MG/DL (ref 0–1.2)
BUN SERPL-MCNC: 17 MG/DL (ref 6–20)
BUPRENORPHINE UR QL: NEGATIVE
CALCIUM SERPL-MCNC: 10 MG/DL (ref 8.6–10.2)
CANNABINOIDS UR QL SCN: POSITIVE
CHLORIDE SERPL-SCNC: 101 MMOL/L (ref 98–107)
CO2 SERPL-SCNC: 27 MMOL/L (ref 22–29)
COCAINE UR QL SCN: NEGATIVE
CREAT SERPL-MCNC: 0.9 MG/DL (ref 0.7–1.2)
EOSINOPHIL # BLD: 0.01 K/UL (ref 0.05–0.5)
EOSINOPHILS RELATIVE PERCENT: 0 % (ref 0–6)
ERYTHROCYTE [DISTWIDTH] IN BLOOD BY AUTOMATED COUNT: 12.1 % (ref 11.5–15)
ETHANOLAMINE SERPL-MCNC: 42 MG/DL (ref 0–0.08)
FENTANYL UR QL: NEGATIVE
GFR, ESTIMATED: >90 ML/MIN/1.73M2
GLUCOSE SERPL-MCNC: 78 MG/DL (ref 74–99)
HCT VFR BLD AUTO: 46.3 % (ref 37–54)
HGB BLD-MCNC: 15.8 G/DL (ref 12.5–16.5)
IMM GRANULOCYTES # BLD AUTO: <0.03 K/UL (ref 0–0.58)
IMM GRANULOCYTES NFR BLD: 0 % (ref 0–5)
LACTATE BLDV-SCNC: 2.8 MMOL/L (ref 0.5–2.2)
LYMPHOCYTES NFR BLD: 1.7 K/UL (ref 1.5–4)
LYMPHOCYTES RELATIVE PERCENT: 32 % (ref 20–42)
MCH RBC QN AUTO: 33.4 PG (ref 26–35)
MCHC RBC AUTO-ENTMCNC: 34.1 G/DL (ref 32–34.5)
MCV RBC AUTO: 97.9 FL (ref 80–99.9)
METHADONE UR QL: NEGATIVE
MONOCYTES NFR BLD: 0.64 K/UL (ref 0.1–0.95)
MONOCYTES NFR BLD: 12 % (ref 2–12)
NEUTROPHILS NFR BLD: 55 % (ref 43–80)
NEUTS SEG NFR BLD: 2.95 K/UL (ref 1.8–7.3)
OPIATES UR QL SCN: NEGATIVE
OXYCODONE UR QL SCN: NEGATIVE
PCP UR QL SCN: NEGATIVE
PLATELET # BLD AUTO: 236 K/UL (ref 130–450)
PMV BLD AUTO: 9.5 FL (ref 7–12)
POTASSIUM SERPL-SCNC: 3.8 MMOL/L (ref 3.5–5)
PROT SERPL-MCNC: 7.9 G/DL (ref 6.4–8.3)
RBC # BLD AUTO: 4.73 M/UL (ref 3.8–5.8)
SALICYLATES SERPL-MCNC: <0.3 MG/DL (ref 0–30)
SODIUM SERPL-SCNC: 144 MMOL/L (ref 132–146)
TEST INFORMATION: ABNORMAL
TOXIC TRICYCLIC SC,BLOOD: NEGATIVE
WBC OTHER # BLD: 5.4 K/UL (ref 4.5–11.5)

## 2024-05-24 PROCEDURE — 80053 COMPREHEN METABOLIC PANEL: CPT

## 2024-05-24 PROCEDURE — 83605 ASSAY OF LACTIC ACID: CPT

## 2024-05-24 PROCEDURE — 96374 THER/PROPH/DIAG INJ IV PUSH: CPT

## 2024-05-24 PROCEDURE — 80307 DRUG TEST PRSMV CHEM ANLYZR: CPT

## 2024-05-24 PROCEDURE — G0480 DRUG TEST DEF 1-7 CLASSES: HCPCS

## 2024-05-24 PROCEDURE — 80179 DRUG ASSAY SALICYLATE: CPT

## 2024-05-24 PROCEDURE — 80143 DRUG ASSAY ACETAMINOPHEN: CPT

## 2024-05-24 PROCEDURE — 99284 EMERGENCY DEPT VISIT MOD MDM: CPT

## 2024-05-24 PROCEDURE — 6360000002 HC RX W HCPCS: Performed by: EMERGENCY MEDICINE

## 2024-05-24 PROCEDURE — 85025 COMPLETE CBC W/AUTO DIFF WBC: CPT

## 2024-05-24 RX ORDER — LEVETIRACETAM 500 MG/5ML
1500 INJECTION, SOLUTION, CONCENTRATE INTRAVENOUS ONCE
Status: COMPLETED | OUTPATIENT
Start: 2024-05-24 | End: 2024-05-24

## 2024-05-24 RX ADMIN — LEVETIRACETAM 1500 MG: 100 INJECTION, SOLUTION INTRAVENOUS at 16:37

## 2024-05-24 NOTE — ED PROVIDER NOTES
with neurology, he voices understanding.  He is educated on signs and symptoms that require emergent evaluation.    Counseling:   The emergency provider has spoken with the patient and discussed today’s results, in addition to providing specific details for the plan of care and counseling regarding the diagnosis and prognosis.  Questions are answered at this time and they are agreeable with the plan.       --------------------------------- IMPRESSION AND DISPOSITION ---------------------------------    IMPRESSION  1. Breakthrough seizure (HCC)        DISPOSITION  Disposition: Discharge to home  Patient condition is stable        NOTE: This report was transcribed using voice recognition software. Every effort was made to ensure accuracy; however, inadvertent computerized transcription errors may be present        Jose Oquendo MD  05/24/24 4623

## 2024-06-01 ENCOUNTER — APPOINTMENT (OUTPATIENT)
Dept: GENERAL RADIOLOGY | Age: 39
End: 2024-06-01
Payer: MEDICAID

## 2024-06-01 ENCOUNTER — HOSPITAL ENCOUNTER (EMERGENCY)
Age: 39
Discharge: LEFT BEFORE DISCHARGE | End: 2024-06-01
Payer: MEDICAID

## 2024-06-01 VITALS
OXYGEN SATURATION: 96 % | HEART RATE: 78 BPM | TEMPERATURE: 98.6 F | SYSTOLIC BLOOD PRESSURE: 143 MMHG | DIASTOLIC BLOOD PRESSURE: 99 MMHG | BODY MASS INDEX: 20.6 KG/M2 | WEIGHT: 120 LBS | RESPIRATION RATE: 18 BRPM

## 2024-06-01 DIAGNOSIS — S90.31XA CONTUSION OF RIGHT FOOT, INITIAL ENCOUNTER: Primary | ICD-10-CM

## 2024-06-01 PROCEDURE — 73590 X-RAY EXAM OF LOWER LEG: CPT

## 2024-06-01 PROCEDURE — 6370000000 HC RX 637 (ALT 250 FOR IP): Performed by: NURSE PRACTITIONER

## 2024-06-01 PROCEDURE — 99283 EMERGENCY DEPT VISIT LOW MDM: CPT

## 2024-06-01 PROCEDURE — 73610 X-RAY EXAM OF ANKLE: CPT

## 2024-06-01 PROCEDURE — 73630 X-RAY EXAM OF FOOT: CPT

## 2024-06-01 RX ORDER — IBUPROFEN 800 MG/1
800 TABLET ORAL ONCE
Status: COMPLETED | OUTPATIENT
Start: 2024-06-01 | End: 2024-06-01

## 2024-06-01 RX ORDER — OXYCODONE HYDROCHLORIDE AND ACETAMINOPHEN 5; 325 MG/1; MG/1
1 TABLET ORAL ONCE
Status: DISCONTINUED | OUTPATIENT
Start: 2024-06-01 | End: 2024-06-01 | Stop reason: HOSPADM

## 2024-06-01 RX ADMIN — IBUPROFEN 800 MG: 800 TABLET, FILM COATED ORAL at 18:08

## 2024-06-01 ASSESSMENT — PAIN DESCRIPTION - ORIENTATION
ORIENTATION: RIGHT
ORIENTATION: RIGHT

## 2024-06-01 ASSESSMENT — PAIN SCALES - GENERAL
PAINLEVEL_OUTOF10: 10
PAINLEVEL_OUTOF10: 10

## 2024-06-01 ASSESSMENT — PAIN DESCRIPTION - LOCATION: LOCATION: FOOT

## 2024-06-01 ASSESSMENT — PAIN DESCRIPTION - PAIN TYPE: TYPE: ACUTE PAIN

## 2024-06-01 ASSESSMENT — PAIN DESCRIPTION - ONSET: ONSET: SUDDEN

## 2024-06-01 ASSESSMENT — PAIN DESCRIPTION - FREQUENCY: FREQUENCY: CONTINUOUS

## 2024-06-01 ASSESSMENT — PAIN - FUNCTIONAL ASSESSMENT: PAIN_FUNCTIONAL_ASSESSMENT: 0-10

## 2024-06-01 ASSESSMENT — PAIN DESCRIPTION - DESCRIPTORS
DESCRIPTORS: DISCOMFORT
DESCRIPTORS: SHARP;THROBBING

## 2024-06-01 NOTE — ED NOTES
Pt left against medical advice. Pt was educated on risk associated with leaving AMA. AMA paper signed.

## 2024-06-01 NOTE — ED NOTES
Pt made the comment of \"not wanting to be beat on\" pt then stated \"disregard that.\" RN offered pt to speak with a  or file a report with the police. Pt declined both.

## 2024-06-02 ENCOUNTER — HOSPITAL ENCOUNTER (EMERGENCY)
Age: 39
Discharge: HOME | End: 2024-06-02
Attending: EMERGENCY MEDICINE
Payer: MEDICAID

## 2024-06-02 ENCOUNTER — APPOINTMENT (OUTPATIENT)
Dept: CT IMAGING | Age: 39
End: 2024-06-02
Attending: EMERGENCY MEDICINE
Payer: MEDICAID

## 2024-06-02 VITALS
DIASTOLIC BLOOD PRESSURE: 95 MMHG | OXYGEN SATURATION: 98 % | RESPIRATION RATE: 16 BRPM | TEMPERATURE: 98.4 F | WEIGHT: 120 LBS | HEART RATE: 82 BPM | BODY MASS INDEX: 20.6 KG/M2 | SYSTOLIC BLOOD PRESSURE: 132 MMHG

## 2024-06-02 DIAGNOSIS — S90.31XA CONTUSION OF FOOT OR HEEL, RIGHT, INITIAL ENCOUNTER: Primary | ICD-10-CM

## 2024-06-02 PROCEDURE — 73700 CT LOWER EXTREMITY W/O DYE: CPT

## 2024-06-02 PROCEDURE — 99284 EMERGENCY DEPT VISIT MOD MDM: CPT

## 2024-06-02 RX ORDER — DICLOFENAC SODIUM 75 MG/1
75 TABLET, DELAYED RELEASE ORAL 2 TIMES DAILY
Qty: 6 TABLET | Refills: 0 | Status: SHIPPED | OUTPATIENT
Start: 2024-06-02 | End: 2024-06-05

## 2024-06-02 ASSESSMENT — PAIN DESCRIPTION - ORIENTATION: ORIENTATION: RIGHT

## 2024-06-02 ASSESSMENT — PAIN DESCRIPTION - LOCATION: LOCATION: FOOT

## 2024-06-02 ASSESSMENT — PAIN SCALES - GENERAL: PAINLEVEL_OUTOF10: 10

## 2024-06-02 ASSESSMENT — PAIN DESCRIPTION - DESCRIPTORS: DESCRIPTORS: ACHING;BURNING;SHARP;NUMBNESS

## 2024-06-02 ASSESSMENT — PAIN - FUNCTIONAL ASSESSMENT: PAIN_FUNCTIONAL_ASSESSMENT: 0-10

## 2024-06-02 NOTE — ED PROVIDER NOTES
tenderness, no gross signs of trauma  Skin: warm and dry without rash  Neurologic: GCS 15,  Psych: Normal Affect      ------------------------------ ED COURSE/MEDICAL DECISION MAKING----------------------  Medications - No data to display      ED COURSE:       Medical Decision Making:      Patient presents after a fall.  Concern for fracture, contusion, abrasion, among other pathologies.  He had ecchymosis over his right heel.  Declining any disease medication.    CT of the right foot interpreted myself shows no obvious fracture, radiology read as above, no acute fracture per their read    Chart reviewed, patient was seen by his PCP on 4/9/2024 for routine visit    Reevaluation, patient's resting, no other symptoms or complaints.  He will provided crutches.  He is to follow-up with his PCP 1 to 2 days.  He is educated on signs and symptoms require emergent evaluation.    Counseling:   The emergency provider has spoken with the patient and discussed today’s results, in addition to providing specific details for the plan of care and counseling regarding the diagnosis and prognosis.  Questions are answered at this time and they are agreeable with the plan.      --------------------------------- IMPRESSION AND DISPOSITION ---------------------------------    IMPRESSION  1. Contusion of foot or heel, right, initial encounter        DISPOSITION  Disposition: Discharge to home  Patient condition is stable      NOTE: This report was transcribed using voice recognition software. Every effort was made to ensure accuracy; however, inadvertent computerized transcription errors may be present        Jose Oquendo MD  06/02/24 5205

## 2024-06-13 ENCOUNTER — HOSPITAL ENCOUNTER (OUTPATIENT)
Dept: CT IMAGING | Age: 39
Discharge: HOME | End: 2024-06-15
Payer: MEDICAID

## 2024-06-13 DIAGNOSIS — G40.89 OTHER SEIZURES (HCC): ICD-10-CM

## 2024-06-13 PROCEDURE — 70450 CT HEAD/BRAIN W/O DYE: CPT

## 2024-07-06 ENCOUNTER — HOSPITAL ENCOUNTER (EMERGENCY)
Age: 39
Discharge: HOME | End: 2024-07-06
Attending: EMERGENCY MEDICINE
Payer: MEDICAID

## 2024-07-06 VITALS
TEMPERATURE: 98.1 F | DIASTOLIC BLOOD PRESSURE: 95 MMHG | HEART RATE: 80 BPM | OXYGEN SATURATION: 95 % | SYSTOLIC BLOOD PRESSURE: 134 MMHG | RESPIRATION RATE: 23 BRPM

## 2024-07-06 DIAGNOSIS — R94.31 QT PROLONGATION: ICD-10-CM

## 2024-07-06 DIAGNOSIS — F10.920 ACUTE ALCOHOLIC INTOXICATION WITHOUT COMPLICATION (HCC): ICD-10-CM

## 2024-07-06 DIAGNOSIS — R56.9 SEIZURE (HCC): Primary | ICD-10-CM

## 2024-07-06 LAB
ALBUMIN SERPL-MCNC: 4.6 G/DL (ref 3.5–5.2)
ALBUMIN SERPL-MCNC: NORMAL G/DL (ref 3.5–5.2)
ALBUMIN/GLOB SERPL: NORMAL {RATIO} (ref 1–2.5)
ALP SERPL-CCNC: 67 U/L (ref 40–129)
ALP SERPL-CCNC: NORMAL U/L (ref 40–129)
ALT SERPL-CCNC: 42 U/L (ref 0–40)
ALT SERPL-CCNC: NORMAL U/L (ref 5–41)
ANION GAP SERPL CALCULATED.3IONS-SCNC: 17 MMOL/L (ref 7–16)
ANION GAP SERPL CALCULATED.3IONS-SCNC: NORMAL MMOL/L (ref 9–17)
AST SERPL-CCNC: 94 U/L (ref 0–39)
AST SERPL-CCNC: NORMAL U/L
BASOPHILS # BLD: 0.1 K/UL (ref 0–0.2)
BASOPHILS NFR BLD: 1 % (ref 0–2)
BILIRUB SERPL-MCNC: 0.3 MG/DL (ref 0–1.2)
BILIRUB SERPL-MCNC: NORMAL MG/DL (ref 0.3–1.2)
BUN SERPL-MCNC: 19 MG/DL (ref 6–20)
BUN SERPL-MCNC: NORMAL MG/DL (ref 6–20)
BUN/CREAT SERPL: NORMAL (ref 9–20)
CALCIUM SERPL-MCNC: 8.6 MG/DL (ref 8.6–10.2)
CALCIUM SERPL-MCNC: NORMAL MG/DL (ref 8.6–10.4)
CHLORIDE SERPL-SCNC: 104 MMOL/L (ref 98–107)
CHLORIDE SERPL-SCNC: NORMAL MMOL/L (ref 98–107)
CO2 SERPL-SCNC: 25 MMOL/L (ref 22–29)
CO2 SERPL-SCNC: NORMAL MMOL/L (ref 20–31)
CREAT SERPL-MCNC: 1 MG/DL (ref 0.7–1.2)
CREAT SERPL-MCNC: NORMAL MG/DL (ref 0.7–1.2)
DATE LAST DOSE: ABNORMAL
DATE LAST DOSE: NORMAL
EOSINOPHIL # BLD: 0.01 K/UL (ref 0.05–0.5)
EOSINOPHILS RELATIVE PERCENT: 0 % (ref 0–6)
ERYTHROCYTE [DISTWIDTH] IN BLOOD BY AUTOMATED COUNT: 12.3 % (ref 11.5–15)
ETHANOL PERCENT: NORMAL %
ETHANOLAMINE SERPL-MCNC: 403 MG/DL (ref 0–0.08)
ETHANOLAMINE SERPL-MCNC: NORMAL MG/DL (ref 0–0.08)
GFR, ESTIMATED: >90 ML/MIN/1.73M2
GFR, ESTIMATED: NORMAL ML/MIN/1.73M2
GLUCOSE BLD-MCNC: 82 MG/DL (ref 74–99)
GLUCOSE SERPL-MCNC: 79 MG/DL (ref 74–99)
GLUCOSE SERPL-MCNC: NORMAL MG/DL (ref 70–99)
HCT VFR BLD AUTO: 44.8 % (ref 37–54)
HGB BLD-MCNC: 15.6 G/DL (ref 12.5–16.5)
IMM GRANULOCYTES # BLD AUTO: <0.03 K/UL (ref 0–0.58)
IMM GRANULOCYTES NFR BLD: 0 % (ref 0–5)
LYMPHOCYTES NFR BLD: 3.42 K/UL (ref 1.5–4)
LYMPHOCYTES RELATIVE PERCENT: 42 % (ref 20–42)
MAGNESIUM SERPL-MCNC: 1.9 MG/DL (ref 1.6–2.6)
MCH RBC QN AUTO: 33.5 PG (ref 26–35)
MCHC RBC AUTO-ENTMCNC: 34.8 G/DL (ref 32–34.5)
MCV RBC AUTO: 96.3 FL (ref 80–99.9)
MONOCYTES NFR BLD: 0.56 K/UL (ref 0.1–0.95)
MONOCYTES NFR BLD: 7 % (ref 2–12)
NEUTROPHILS NFR BLD: 50 % (ref 43–80)
NEUTS SEG NFR BLD: 4.09 K/UL (ref 1.8–7.3)
PLATELET # BLD AUTO: 228 K/UL (ref 130–450)
PMV BLD AUTO: 9.1 FL (ref 7–12)
POTASSIUM SERPL-SCNC: 3.5 MMOL/L (ref 3.5–5)
POTASSIUM SERPL-SCNC: NORMAL MMOL/L (ref 3.7–5.3)
PROT SERPL-MCNC: 7 G/DL (ref 6.4–8.3)
PROT SERPL-MCNC: NORMAL G/DL (ref 6.4–8.3)
RBC # BLD AUTO: 4.65 M/UL (ref 3.8–5.8)
SODIUM SERPL-SCNC: 146 MMOL/L (ref 132–146)
SODIUM SERPL-SCNC: NORMAL MMOL/L (ref 135–144)
TME LAST DOSE: ABNORMAL H
TME LAST DOSE: NORMAL H
VALPROATE SERPL-MCNC: <3 UG/ML (ref 50–100)
VALPROATE SERPL-MCNC: NORMAL UG/ML (ref 50–125)
VANCOMYCIN DOSE: ABNORMAL MG
VANCOMYCIN DOSE: NORMAL MG
WBC OTHER # BLD: 8.2 K/UL (ref 4.5–11.5)

## 2024-07-06 PROCEDURE — 93005 ELECTROCARDIOGRAM TRACING: CPT

## 2024-07-06 PROCEDURE — 83735 ASSAY OF MAGNESIUM: CPT

## 2024-07-06 PROCEDURE — 80053 COMPREHEN METABOLIC PANEL: CPT

## 2024-07-06 PROCEDURE — 96374 THER/PROPH/DIAG INJ IV PUSH: CPT

## 2024-07-06 PROCEDURE — G0480 DRUG TEST DEF 1-7 CLASSES: HCPCS

## 2024-07-06 PROCEDURE — 82962 GLUCOSE BLOOD TEST: CPT

## 2024-07-06 PROCEDURE — 6370000000 HC RX 637 (ALT 250 FOR IP)

## 2024-07-06 PROCEDURE — 85025 COMPLETE CBC W/AUTO DIFF WBC: CPT

## 2024-07-06 PROCEDURE — 6360000002 HC RX W HCPCS: Performed by: EMERGENCY MEDICINE

## 2024-07-06 PROCEDURE — 2580000003 HC RX 258

## 2024-07-06 PROCEDURE — 99284 EMERGENCY DEPT VISIT MOD MDM: CPT

## 2024-07-06 PROCEDURE — 80164 ASSAY DIPROPYLACETIC ACD TOT: CPT

## 2024-07-06 RX ORDER — LORAZEPAM 1 MG/1
1 TABLET ORAL ONCE
Status: COMPLETED | OUTPATIENT
Start: 2024-07-06 | End: 2024-07-06

## 2024-07-06 RX ORDER — 0.9 % SODIUM CHLORIDE 0.9 %
1000 INTRAVENOUS SOLUTION INTRAVENOUS ONCE
Status: COMPLETED | OUTPATIENT
Start: 2024-07-06 | End: 2024-07-06

## 2024-07-06 RX ORDER — DIVALPROEX SODIUM 250 MG/1
500 TABLET, DELAYED RELEASE ORAL ONCE
Status: COMPLETED | OUTPATIENT
Start: 2024-07-06 | End: 2024-07-06

## 2024-07-06 RX ORDER — KETOROLAC TROMETHAMINE 15 MG/ML
15 INJECTION, SOLUTION INTRAMUSCULAR; INTRAVENOUS ONCE
Status: COMPLETED | OUTPATIENT
Start: 2024-07-06 | End: 2024-07-06

## 2024-07-06 RX ADMIN — LORAZEPAM 1 MG: 1 TABLET ORAL at 22:05

## 2024-07-06 RX ADMIN — SODIUM CHLORIDE 1000 ML: 9 INJECTION, SOLUTION INTRAVENOUS at 21:06

## 2024-07-06 RX ADMIN — DIVALPROEX SODIUM 500 MG: 250 TABLET, DELAYED RELEASE ORAL at 22:06

## 2024-07-06 RX ADMIN — KETOROLAC TROMETHAMINE 15 MG: 15 INJECTION, SOLUTION INTRAMUSCULAR; INTRAVENOUS at 21:44

## 2024-07-06 NOTE — ED PROVIDER NOTES
[facility] EMERGENCY DEPARTMENT  EMERGENCY DEPARTMENT ENCOUNTER        Pt Name: Alfonso Ospina  MRN: [id number]  Birthdate 1985  Date of evaluation: 7/6/2024  Provider: Ramses Sharma MD  Attending Provider: No att. providers found  PCP: Carli Maki PA  Note Started: 7:38 PM EDT 7/6/24    CHIEF COMPLAINT       Chief Complaint   Patient presents with    Seizures     Diazepam given, hx of, had seizure for 5 mins at [facility]        HISTORY OF PRESENT ILLNESS: 1 or more Elements   History From: This report and the patient        Alfonso Ospina is a 38 y.o. male with a past medical history of HIV, hypertension, seizure disorder, alcohol abuse presenting with seizure.  Patient was at [facility] recovery checking in for his alcohol abuse when he started having convulsions lasting approximately 5 minutes.  Patient was given rectal diazepam by [facility] staff.  He did not fall traumatically.  EMS reports a blood sugar of 110 and he was otherwise hemodynamically stable en route to the hospital.  Patient was reportedly postictal for EMS.     Patient reports that he was checking in for alcohol abuse.  He endorses drinking a pint of vodka daily for the last several days including today.  His last drink was several hours prior to presentation.  He does endorse that he has had seizures after alcohol intoxication.  He states that he takes Depakote for his seizures.    Nursing Notes were all reviewed and agreed with or any disagreements were addressed in the HPI.      REVIEW OF EXTERNAL NOTE :           PDMP Monitoring:    Last PDMP Dung as Reviewed:  Review User Review Instant Review Result          Last Controlled Substance Monitoring Documentation      Flowsheet Row ED from 6/1/2024 in [facility] Emergency Department   Comments left ama filed at 06/01/2024 2010          Urine Drug Screenings (1 yr)       Urine Drug Screen  Collected: 5/24/2024  6:09 PM (Final result)  components within normal limits   COMPREHENSIVE METABOLIC PANEL   ETHANOL   VALPROIC ACID LEVEL, TOTAL   MAGNESIUM   POCT GLUCOSE   POCT GLUCOSE       As interpreted by me, the above displayed labs are abnormal. All other labs obtained during this visit were within normal range or not returned as of this dictation.      EKG Interpretation  Interpreted by emergency department physician, Ramses Sharma MD      In the ED course        RADIOLOGY:   Non-plain film images such as CT, Ultrasound and MRI are read by the radiologist. Plain radiographic images are visualized and preliminarily interpreted by the ED Provider with the below findings:    N/A    Interpretation per the Radiologist below, if available at the time of this note:    No orders to display     No results found.    No results found.    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     EMERGENCY DEPARTMENT COURSE    Vitals:    Vitals:    07/06/24 2137 07/06/24 2143 07/06/24 2207 07/06/24 2215   BP:  (!) 134/95     Pulse:  (!) 120 (!) 103 80   Resp: 28  23 23   Temp:       TempSrc:       SpO2: 95%          Patient was given the following medications:  Medications   sodium chloride 0.9 % bolus 1,000 mL (0 mLs IntraVENous Stopped 7/6/24 2302)   ketorolac (TORADOL) injection 15 mg (15 mg IntraVENous Given 7/6/24 2144)   LORazepam (ATIVAN) tablet 1 mg (1 mg Oral Given 7/6/24 2205)   divalproex (DEPAKOTE) DR tablet 500 mg  CMP to assess electrolytes, kidney function, liver function or any metabolic derangements. Drugs of abuse studies to evaluate for signs of toxicity and overdose.  Valproic acid level was ordered to assess whether the patient is therapeutic in terms of his valproic acid.    CBC is reassuring.  CMP shows anion gap 17, ALT of 42, AST of 94 suggestive of mild transaminitis.  Valproic acid is less than 3 and subtherapeutic.  Point-of-care glucose this is 82.  Magnesium is normal.  Ethanol level is 403.  EKG not suggestive of acute ischemic pathology.    Patient given Decabid code in the emergency room.  Nursing staff noted that patient was tachycardic and seemingly agitated.  There were concern about alcohol withdrawal.  Patient is still clinically intoxicated but he remains alert and oriented and demonstrates staying with his condition and deemed to have capacity.  Patient was given Ativan for his symptoms.     Patient's  now presents to the emergency room.  Patient would like to leave to go back to [facility].  Patient's  will drive him to [facility].  Patient is intoxicated and concern for alcohol withdrawal is low at this time as his vital signs are improving.  Decision was made to discharge patient.  At the time of discharge, patient was hemodynamically stable.      CONSULTS: (Who and What was discussed)  None        I am the Primary Clinician of Record.    FINAL IMPRESSION      1. Seizure (HCC)    2. QT prolongation    3. Acute alcoholic intoxication without complication (HCC)          DISPOSITION/PLAN     DISPOSITION Decision To Discharge 07/06/2024 10:45:27 PM      PATIENT REFERRED TO:  [facility]  [address], [place] [address] [phone]  Go to       [facility] Emergency Department  [address] [place] [address]  [phone]    If symptoms worsen      DISCHARGE MEDICATIONS:  Discharge Medication List as of 7/6/2024 11:01 PM

## 2024-07-07 ENCOUNTER — APPOINTMENT (OUTPATIENT)
Dept: CT IMAGING | Age: 39
End: 2024-07-07
Payer: MEDICAID

## 2024-07-07 ENCOUNTER — HOSPITAL ENCOUNTER (OUTPATIENT)
Age: 39
Setting detail: OBSERVATION
Discharge: NURSING, REHAB OR LONG-TERM CARE | End: 2024-07-09
Attending: EMERGENCY MEDICINE | Admitting: INTERNAL MEDICINE
Payer: MEDICAID

## 2024-07-07 ENCOUNTER — HOSPITAL ENCOUNTER (EMERGENCY)
Age: 39
Discharge: HOME | End: 2024-07-07
Attending: EMERGENCY MEDICINE
Payer: MEDICAID

## 2024-07-07 VITALS
OXYGEN SATURATION: 95 % | HEART RATE: 96 BPM | DIASTOLIC BLOOD PRESSURE: 89 MMHG | RESPIRATION RATE: 18 BRPM | TEMPERATURE: 98.6 F | SYSTOLIC BLOOD PRESSURE: 130 MMHG

## 2024-07-07 DIAGNOSIS — F10.920 ALCOHOLIC INTOXICATION WITHOUT COMPLICATION (HCC): ICD-10-CM

## 2024-07-07 DIAGNOSIS — G40.919 BREAKTHROUGH SEIZURE (HCC): Primary | ICD-10-CM

## 2024-07-07 DIAGNOSIS — F10.10 ALCOHOL ABUSE: ICD-10-CM

## 2024-07-07 LAB
ALBUMIN SERPL-MCNC: 4.3 G/DL (ref 3.5–5.2)
ALBUMIN SERPL-MCNC: 4.3 G/DL (ref 3.5–5.2)
ALP SERPL-CCNC: 65 U/L (ref 40–129)
ALP SERPL-CCNC: 67 U/L (ref 40–129)
ALT SERPL-CCNC: 53 U/L (ref 0–40)
ALT SERPL-CCNC: 62 U/L (ref 0–40)
ANION GAP SERPL CALCULATED.3IONS-SCNC: 15 MMOL/L (ref 7–16)
ANION GAP SERPL CALCULATED.3IONS-SCNC: 16 MMOL/L (ref 7–16)
APAP SERPL-MCNC: <5 UG/ML (ref 10–30)
AST SERPL-CCNC: 115 U/L (ref 0–39)
AST SERPL-CCNC: 122 U/L (ref 0–39)
BASOPHILS # BLD: 0.08 K/UL (ref 0–0.2)
BASOPHILS # BLD: 0.1 K/UL (ref 0–0.2)
BASOPHILS NFR BLD: 2 % (ref 0–2)
BASOPHILS NFR BLD: 2 % (ref 0–2)
BILIRUB SERPL-MCNC: 0.4 MG/DL (ref 0–1.2)
BILIRUB SERPL-MCNC: 0.4 MG/DL (ref 0–1.2)
BUN SERPL-MCNC: 16 MG/DL (ref 6–20)
BUN SERPL-MCNC: 16 MG/DL (ref 6–20)
CALCIUM SERPL-MCNC: 8.2 MG/DL (ref 8.6–10.2)
CALCIUM SERPL-MCNC: 8.6 MG/DL (ref 8.6–10.2)
CHLORIDE SERPL-SCNC: 104 MMOL/L (ref 98–107)
CHLORIDE SERPL-SCNC: 104 MMOL/L (ref 98–107)
CO2 SERPL-SCNC: 24 MMOL/L (ref 22–29)
CO2 SERPL-SCNC: 25 MMOL/L (ref 22–29)
CREAT SERPL-MCNC: 0.8 MG/DL (ref 0.7–1.2)
CREAT SERPL-MCNC: 0.9 MG/DL (ref 0.7–1.2)
DATE LAST DOSE: ABNORMAL
EKG ATRIAL RATE: 97 BPM
EKG P AXIS: 63 DEGREES
EKG P-R INTERVAL: 118 MS
EKG Q-T INTERVAL: 390 MS
EKG QRS DURATION: 100 MS
EKG QTC CALCULATION (BAZETT): 495 MS
EKG R AXIS: 72 DEGREES
EKG T AXIS: 48 DEGREES
EKG VENTRICULAR RATE: 97 BPM
EOSINOPHIL # BLD: 0.05 K/UL (ref 0.05–0.5)
EOSINOPHIL # BLD: 0.05 K/UL (ref 0.05–0.5)
EOSINOPHILS RELATIVE PERCENT: 1 % (ref 0–6)
EOSINOPHILS RELATIVE PERCENT: 1 % (ref 0–6)
ERYTHROCYTE [DISTWIDTH] IN BLOOD BY AUTOMATED COUNT: 11.9 % (ref 11.5–15)
ERYTHROCYTE [DISTWIDTH] IN BLOOD BY AUTOMATED COUNT: 12.3 % (ref 11.5–15)
ETHANOLAMINE SERPL-MCNC: 185 MG/DL (ref 0–0.08)
ETHANOLAMINE SERPL-MCNC: 364 MG/DL (ref 0–0.08)
GFR, ESTIMATED: >90 ML/MIN/1.73M2
GFR, ESTIMATED: >90 ML/MIN/1.73M2
GLUCOSE SERPL-MCNC: 81 MG/DL (ref 74–99)
GLUCOSE SERPL-MCNC: 91 MG/DL (ref 74–99)
HCT VFR BLD AUTO: 40.3 % (ref 37–54)
HCT VFR BLD AUTO: 43.8 % (ref 37–54)
HGB BLD-MCNC: 13.9 G/DL (ref 12.5–16.5)
HGB BLD-MCNC: 14.5 G/DL (ref 12.5–16.5)
IMM GRANULOCYTES # BLD AUTO: <0.03 K/UL (ref 0–0.58)
IMM GRANULOCYTES # BLD AUTO: <0.03 K/UL (ref 0–0.58)
IMM GRANULOCYTES NFR BLD: 0 % (ref 0–5)
IMM GRANULOCYTES NFR BLD: 0 % (ref 0–5)
LYMPHOCYTES NFR BLD: 2.47 K/UL (ref 1.5–4)
LYMPHOCYTES NFR BLD: 3.01 K/UL (ref 1.5–4)
LYMPHOCYTES RELATIVE PERCENT: 51 % (ref 20–42)
LYMPHOCYTES RELATIVE PERCENT: 57 % (ref 20–42)
MAGNESIUM SERPL-MCNC: 2 MG/DL (ref 1.6–2.6)
MCH RBC QN AUTO: 32.2 PG (ref 26–35)
MCH RBC QN AUTO: 33.1 PG (ref 26–35)
MCHC RBC AUTO-ENTMCNC: 33.1 G/DL (ref 32–34.5)
MCHC RBC AUTO-ENTMCNC: 34.5 G/DL (ref 32–34.5)
MCV RBC AUTO: 96 FL (ref 80–99.9)
MCV RBC AUTO: 97.1 FL (ref 80–99.9)
MONOCYTES NFR BLD: 0.51 K/UL (ref 0.1–0.95)
MONOCYTES NFR BLD: 0.55 K/UL (ref 0.1–0.95)
MONOCYTES NFR BLD: 10 % (ref 2–12)
MONOCYTES NFR BLD: 11 % (ref 2–12)
NEUTROPHILS NFR BLD: 30 % (ref 43–80)
NEUTROPHILS NFR BLD: 36 % (ref 43–80)
NEUTS SEG NFR BLD: 1.58 K/UL (ref 1.8–7.3)
NEUTS SEG NFR BLD: 1.74 K/UL (ref 1.8–7.3)
PLATELET # BLD AUTO: 206 K/UL (ref 130–450)
PLATELET # BLD AUTO: 224 K/UL (ref 130–450)
PMV BLD AUTO: 9.2 FL (ref 7–12)
PMV BLD AUTO: 9.3 FL (ref 7–12)
POTASSIUM SERPL-SCNC: 4 MMOL/L (ref 3.5–5)
POTASSIUM SERPL-SCNC: 4.1 MMOL/L (ref 3.5–5)
PROT SERPL-MCNC: 6.3 G/DL (ref 6.4–8.3)
PROT SERPL-MCNC: 6.7 G/DL (ref 6.4–8.3)
RBC # BLD AUTO: 4.2 M/UL (ref 3.8–5.8)
RBC # BLD AUTO: 4.51 M/UL (ref 3.8–5.8)
SALICYLATES SERPL-MCNC: <0.3 MG/DL (ref 0–30)
SODIUM SERPL-SCNC: 143 MMOL/L (ref 132–146)
SODIUM SERPL-SCNC: 145 MMOL/L (ref 132–146)
TME LAST DOSE: ABNORMAL H
TOXIC TRICYCLIC SC,BLOOD: NEGATIVE
VALPROATE SERPL-MCNC: 21 UG/ML (ref 50–100)
VALPROATE SERPL-MCNC: 62 UG/ML (ref 50–100)
VANCOMYCIN DOSE: ABNORMAL MG
WBC OTHER # BLD: 4.9 K/UL (ref 4.5–11.5)
WBC OTHER # BLD: 5.3 K/UL (ref 4.5–11.5)

## 2024-07-07 PROCEDURE — 80164 ASSAY DIPROPYLACETIC ACD TOT: CPT

## 2024-07-07 PROCEDURE — 80143 DRUG ASSAY ACETAMINOPHEN: CPT

## 2024-07-07 PROCEDURE — G0378 HOSPITAL OBSERVATION PER HR: HCPCS

## 2024-07-07 PROCEDURE — 99222 1ST HOSP IP/OBS MODERATE 55: CPT | Performed by: INTERNAL MEDICINE

## 2024-07-07 PROCEDURE — 99285 EMERGENCY DEPT VISIT HI MDM: CPT

## 2024-07-07 PROCEDURE — 80053 COMPREHEN METABOLIC PANEL: CPT

## 2024-07-07 PROCEDURE — 85025 COMPLETE CBC W/AUTO DIFF WBC: CPT

## 2024-07-07 PROCEDURE — 93010 ELECTROCARDIOGRAM REPORT: CPT | Performed by: INTERNAL MEDICINE

## 2024-07-07 PROCEDURE — 6370000000 HC RX 637 (ALT 250 FOR IP): Performed by: EMERGENCY MEDICINE

## 2024-07-07 PROCEDURE — G0480 DRUG TEST DEF 1-7 CLASSES: HCPCS

## 2024-07-07 PROCEDURE — 80179 DRUG ASSAY SALICYLATE: CPT

## 2024-07-07 PROCEDURE — 6370000000 HC RX 637 (ALT 250 FOR IP): Performed by: INTERNAL MEDICINE

## 2024-07-07 PROCEDURE — 93005 ELECTROCARDIOGRAM TRACING: CPT | Performed by: EMERGENCY MEDICINE

## 2024-07-07 PROCEDURE — 6360000002 HC RX W HCPCS: Performed by: EMERGENCY MEDICINE

## 2024-07-07 PROCEDURE — 99284 EMERGENCY DEPT VISIT MOD MDM: CPT

## 2024-07-07 PROCEDURE — 80307 DRUG TEST PRSMV CHEM ANLYZR: CPT

## 2024-07-07 PROCEDURE — 83735 ASSAY OF MAGNESIUM: CPT

## 2024-07-07 PROCEDURE — 70450 CT HEAD/BRAIN W/O DYE: CPT

## 2024-07-07 RX ORDER — MAGNESIUM SULFATE IN WATER 40 MG/ML
2000 INJECTION, SOLUTION INTRAVENOUS PRN
Status: DISCONTINUED | OUTPATIENT
Start: 2024-07-07 | End: 2024-07-09 | Stop reason: HOSPADM

## 2024-07-07 RX ORDER — LEVETIRACETAM 500 MG/1
500 TABLET ORAL 2 TIMES DAILY
Status: DISCONTINUED | OUTPATIENT
Start: 2024-07-07 | End: 2024-07-07

## 2024-07-07 RX ORDER — SODIUM CHLORIDE 9 MG/ML
INJECTION, SOLUTION INTRAVENOUS PRN
Status: DISCONTINUED | OUTPATIENT
Start: 2024-07-07 | End: 2024-07-09 | Stop reason: HOSPADM

## 2024-07-07 RX ORDER — ACETAMINOPHEN 650 MG/1
650 SUPPOSITORY RECTAL EVERY 6 HOURS PRN
Status: DISCONTINUED | OUTPATIENT
Start: 2024-07-07 | End: 2024-07-09 | Stop reason: HOSPADM

## 2024-07-07 RX ORDER — LISINOPRIL 10 MG/1
5 TABLET ORAL DAILY
Status: DISCONTINUED | OUTPATIENT
Start: 2024-07-08 | End: 2024-07-09 | Stop reason: HOSPADM

## 2024-07-07 RX ORDER — ONDANSETRON 4 MG/1
4 TABLET, ORALLY DISINTEGRATING ORAL EVERY 8 HOURS PRN
Status: DISCONTINUED | OUTPATIENT
Start: 2024-07-07 | End: 2024-07-09 | Stop reason: HOSPADM

## 2024-07-07 RX ORDER — ENOXAPARIN SODIUM 100 MG/ML
40 INJECTION SUBCUTANEOUS DAILY
Status: DISCONTINUED | OUTPATIENT
Start: 2024-07-08 | End: 2024-07-09 | Stop reason: HOSPADM

## 2024-07-07 RX ORDER — LEVETIRACETAM 500 MG/1
1000 TABLET ORAL ONCE
Status: COMPLETED | OUTPATIENT
Start: 2024-07-07 | End: 2024-07-07

## 2024-07-07 RX ORDER — VENLAFAXINE HYDROCHLORIDE 75 MG/1
75 CAPSULE, EXTENDED RELEASE ORAL DAILY
Status: DISCONTINUED | OUTPATIENT
Start: 2024-07-08 | End: 2024-07-09 | Stop reason: HOSPADM

## 2024-07-07 RX ORDER — LEVETIRACETAM 500 MG/1
500 TABLET ORAL 2 TIMES DAILY
Status: DISCONTINUED | OUTPATIENT
Start: 2024-07-08 | End: 2024-07-08

## 2024-07-07 RX ORDER — ALBUTEROL SULFATE 2.5 MG/3ML
2.5 SOLUTION RESPIRATORY (INHALATION) EVERY 6 HOURS PRN
Status: DISCONTINUED | OUTPATIENT
Start: 2024-07-07 | End: 2024-07-09 | Stop reason: HOSPADM

## 2024-07-07 RX ORDER — HYDRALAZINE HYDROCHLORIDE 20 MG/ML
10 INJECTION INTRAMUSCULAR; INTRAVENOUS EVERY 6 HOURS PRN
Status: DISCONTINUED | OUTPATIENT
Start: 2024-07-07 | End: 2024-07-09 | Stop reason: HOSPADM

## 2024-07-07 RX ORDER — ONDANSETRON 2 MG/ML
4 INJECTION INTRAMUSCULAR; INTRAVENOUS EVERY 6 HOURS PRN
Status: DISCONTINUED | OUTPATIENT
Start: 2024-07-07 | End: 2024-07-09 | Stop reason: HOSPADM

## 2024-07-07 RX ORDER — DIVALPROEX SODIUM 500 MG/1
500 TABLET, DELAYED RELEASE ORAL 2 TIMES DAILY
Status: DISCONTINUED | OUTPATIENT
Start: 2024-07-07 | End: 2024-07-09 | Stop reason: HOSPADM

## 2024-07-07 RX ORDER — LEVETIRACETAM 500 MG/5ML
1500 INJECTION, SOLUTION, CONCENTRATE INTRAVENOUS ONCE
Status: DISCONTINUED | OUTPATIENT
Start: 2024-07-07 | End: 2024-07-07

## 2024-07-07 RX ORDER — ACETAMINOPHEN 325 MG/1
650 TABLET ORAL EVERY 6 HOURS PRN
Status: DISCONTINUED | OUTPATIENT
Start: 2024-07-07 | End: 2024-07-09 | Stop reason: HOSPADM

## 2024-07-07 RX ORDER — POLYETHYLENE GLYCOL 3350 17 G/17G
17 POWDER, FOR SOLUTION ORAL DAILY PRN
Status: DISCONTINUED | OUTPATIENT
Start: 2024-07-07 | End: 2024-07-09 | Stop reason: HOSPADM

## 2024-07-07 RX ORDER — LANOLIN ALCOHOL/MO/W.PET/CERES
3 CREAM (GRAM) TOPICAL NIGHTLY PRN
Status: DISCONTINUED | OUTPATIENT
Start: 2024-07-08 | End: 2024-07-09 | Stop reason: HOSPADM

## 2024-07-07 RX ORDER — ALBUTEROL SULFATE 90 UG/1
2 AEROSOL, METERED RESPIRATORY (INHALATION) EVERY 6 HOURS PRN
Status: DISCONTINUED | OUTPATIENT
Start: 2024-07-07 | End: 2024-07-07 | Stop reason: SDUPTHER

## 2024-07-07 RX ORDER — SODIUM CHLORIDE 0.9 % (FLUSH) 0.9 %
5-40 SYRINGE (ML) INJECTION PRN
Status: DISCONTINUED | OUTPATIENT
Start: 2024-07-07 | End: 2024-07-09 | Stop reason: HOSPADM

## 2024-07-07 RX ORDER — BENZONATATE 100 MG/1
100 CAPSULE ORAL 3 TIMES DAILY PRN
Status: DISCONTINUED | OUTPATIENT
Start: 2024-07-07 | End: 2024-07-09 | Stop reason: HOSPADM

## 2024-07-07 RX ORDER — POTASSIUM CHLORIDE 7.45 MG/ML
10 INJECTION INTRAVENOUS PRN
Status: DISCONTINUED | OUTPATIENT
Start: 2024-07-07 | End: 2024-07-09 | Stop reason: HOSPADM

## 2024-07-07 RX ORDER — SODIUM CHLORIDE 0.9 % (FLUSH) 0.9 %
5-40 SYRINGE (ML) INJECTION EVERY 12 HOURS SCHEDULED
Status: DISCONTINUED | OUTPATIENT
Start: 2024-07-07 | End: 2024-07-09 | Stop reason: HOSPADM

## 2024-07-07 RX ORDER — CALCIUM CARBONATE 500 MG/1
500 TABLET, CHEWABLE ORAL 3 TIMES DAILY PRN
Status: DISCONTINUED | OUTPATIENT
Start: 2024-07-07 | End: 2024-07-09 | Stop reason: HOSPADM

## 2024-07-07 RX ORDER — DIVALPROEX SODIUM 250 MG/1
500 TABLET, DELAYED RELEASE ORAL ONCE
Status: COMPLETED | OUTPATIENT
Start: 2024-07-07 | End: 2024-07-07

## 2024-07-07 RX ORDER — POTASSIUM CHLORIDE 20 MEQ/1
40 TABLET, EXTENDED RELEASE ORAL PRN
Status: DISCONTINUED | OUTPATIENT
Start: 2024-07-07 | End: 2024-07-09 | Stop reason: HOSPADM

## 2024-07-07 RX ADMIN — DIVALPROEX SODIUM 500 MG: 500 TABLET, DELAYED RELEASE ORAL at 23:14

## 2024-07-07 RX ADMIN — METOPROLOL TARTRATE 25 MG: 25 TABLET, FILM COATED ORAL at 23:13

## 2024-07-07 RX ADMIN — LEVETIRACETAM 1000 MG: 500 TABLET, FILM COATED ORAL at 21:45

## 2024-07-07 RX ADMIN — DIVALPROEX SODIUM 500 MG: 250 TABLET, DELAYED RELEASE ORAL at 15:00

## 2024-07-07 NOTE — CARE COORDINATION
CM note: Received a call from ER nurse, Amairani.  States patient came from [facility] and they were in the process of setting up transport when [facility] called back stating they would not accept patient back.  Amairani called CM requesting assistance in getting patient accepted at [facility] per patient's SO request.  Upon further chart review, patient may have never actually been admitted to [facility].  He was checking in on 7/6 when he had a seizure which prompted the staff to call for EMS for transfer to the hospital. Pt was seen here yesterday and discharged with his SO and was going to go back to check in to [facility] this morning.  Pt re-presented to the ER with additional seizure activity.  CM will fax clinicals to [facility] and await determination.

## 2024-07-07 NOTE — ED NOTES
Pt discharged with . Will return to [facility] in the morning as they advised they will not accept him until he follows up with Neurology.

## 2024-07-07 NOTE — ED PROVIDER NOTES
counseling regarding the diagnosis and prognosis.  Their questions are answered at this time and they are agreeable with the plan. I discussed at length with them reasons for immediate return here for re evaluation. They will followup with primary care by calling their office tomorrow.      --------------------------------- ADDITIONAL PROVIDER NOTES ---------------------------------  At this time the patient is without objective evidence of an acute process requiring hospitalization or inpatient management.  They have remained hemodynamically stable throughout their entire ED visit and are stable for discharge with outpatient follow-up.     The plan has been discussed in detail and they are aware of the specific conditions for emergent return, as well as the importance of follow-up.      New Prescriptions    No medications on file       Diagnosis:  1. Breakthrough seizure (HCC)    2. Alcohol abuse        Disposition:  Patient's disposition: Discharge to home  Patient's condition is stable.         Benito Dewey DO  07/07/24 1429

## 2024-07-07 NOTE — CARE COORDINATION
CM note: Spoke with Rishi at [facility], faxed clinicals as requested to [phone].  Rishi states that after reviewing clinicals he will need to speak with the patient.  Provided ER phone number in the event he is unable to make contact with patient via's patient's cell phone.  Also instructed Rishi to call ER directly if able to accept as CM will not be present in the hospital.  If [facility] is unable to accept, patient should be made observation and SW/KIKO can obtain assistance of PRS peer in getting patient placed in an inpatient rehab facility.

## 2024-07-08 LAB
ALBUMIN SERPL-MCNC: 4.2 G/DL (ref 3.5–5.2)
ALBUMIN SERPL-MCNC: NORMAL G/DL (ref 3.5–5.2)
ALBUMIN/GLOB SERPL: NORMAL {RATIO} (ref 1–2.5)
ALP SERPL-CCNC: 65 U/L (ref 40–129)
ALP SERPL-CCNC: NORMAL U/L (ref 40–129)
ALT SERPL-CCNC: 62 U/L (ref 0–40)
ALT SERPL-CCNC: NORMAL U/L (ref 5–41)
ANION GAP SERPL CALCULATED.3IONS-SCNC: 18 MMOL/L (ref 7–16)
ANION GAP SERPL CALCULATED.3IONS-SCNC: NORMAL MMOL/L (ref 9–17)
AST SERPL-CCNC: 116 U/L (ref 0–39)
AST SERPL-CCNC: NORMAL U/L
BILIRUB SERPL-MCNC: 0.6 MG/DL (ref 0–1.2)
BILIRUB SERPL-MCNC: NORMAL MG/DL (ref 0.3–1.2)
BUN SERPL-MCNC: 19 MG/DL (ref 6–20)
BUN SERPL-MCNC: NORMAL MG/DL (ref 6–20)
BUN/CREAT SERPL: NORMAL (ref 9–20)
CALCIUM SERPL-MCNC: 9 MG/DL (ref 8.6–10.2)
CALCIUM SERPL-MCNC: NORMAL MG/DL (ref 8.6–10.4)
CHLORIDE SERPL-SCNC: 97 MMOL/L (ref 98–107)
CHLORIDE SERPL-SCNC: NORMAL MMOL/L (ref 98–107)
CHOLEST SERPL-MCNC: 208 MG/DL
CHOLEST SERPL-MCNC: NORMAL MG/DL
CHOLESTEROL/HDL RATIO: NORMAL
CO2 SERPL-SCNC: 23 MMOL/L (ref 22–29)
CO2 SERPL-SCNC: NORMAL MMOL/L (ref 20–31)
CREAT SERPL-MCNC: 0.7 MG/DL (ref 0.7–1.2)
CREAT SERPL-MCNC: NORMAL MG/DL (ref 0.7–1.2)
EKG ATRIAL RATE: 93 BPM
EKG P AXIS: 50 DEGREES
EKG P-R INTERVAL: 130 MS
EKG Q-T INTERVAL: 398 MS
EKG QRS DURATION: 102 MS
EKG QTC CALCULATION (BAZETT): 494 MS
EKG R AXIS: 58 DEGREES
EKG T AXIS: 31 DEGREES
EKG VENTRICULAR RATE: 93 BPM
GFR, ESTIMATED: >90 ML/MIN/1.73M2
GFR, ESTIMATED: NORMAL ML/MIN/1.73M2
GLUCOSE SERPL-MCNC: 90 MG/DL (ref 74–99)
GLUCOSE SERPL-MCNC: NORMAL MG/DL (ref 70–99)
HBA1C MFR BLD: 5.4 % (ref 4–5.6)
HDLC SERPL-MCNC: 124 MG/DL
HDLC SERPL-MCNC: NORMAL MG/DL
LDLC SERPL CALC-MCNC: 65 MG/DL
LDLC SERPL CALC-MCNC: NORMAL MG/DL (ref 0–130)
MAGNESIUM SERPL-MCNC: 1.6 MG/DL (ref 1.6–2.6)
MAGNESIUM SERPL-MCNC: NORMAL MG/DL (ref 1.6–2.6)
PHOSPHATE SERPL-MCNC: 2.6 MG/DL (ref 2.5–4.5)
PHOSPHATE SERPL-MCNC: NORMAL MG/DL (ref 2.5–4.5)
POTASSIUM SERPL-SCNC: 3.6 MMOL/L (ref 3.5–5)
POTASSIUM SERPL-SCNC: NORMAL MMOL/L (ref 3.7–5.3)
PROT SERPL-MCNC: 6.5 G/DL (ref 6.4–8.3)
PROT SERPL-MCNC: NORMAL G/DL (ref 6.4–8.3)
SODIUM SERPL-SCNC: 138 MMOL/L (ref 132–146)
SODIUM SERPL-SCNC: NORMAL MMOL/L (ref 135–144)
TRIGL SERPL-MCNC: 96 MG/DL
TRIGL SERPL-MCNC: NORMAL MG/DL
TSH SERPL DL<=0.05 MIU/L-ACNC: 0.86 UIU/ML (ref 0.27–4.2)
TSH SERPL DL<=0.05 MIU/L-ACNC: NORMAL UIU/ML (ref 0.3–5)
VLDLC SERPL CALC-MCNC: 19 MG/DL
VLDLC SERPL CALC-MCNC: NORMAL MG/DL (ref 1–30)

## 2024-07-08 PROCEDURE — 83036 HEMOGLOBIN GLYCOSYLATED A1C: CPT

## 2024-07-08 PROCEDURE — G0378 HOSPITAL OBSERVATION PER HR: HCPCS

## 2024-07-08 PROCEDURE — 83735 ASSAY OF MAGNESIUM: CPT

## 2024-07-08 PROCEDURE — 80053 COMPREHEN METABOLIC PANEL: CPT

## 2024-07-08 PROCEDURE — 6360000002 HC RX W HCPCS: Performed by: INTERNAL MEDICINE

## 2024-07-08 PROCEDURE — 80061 LIPID PANEL: CPT

## 2024-07-08 PROCEDURE — 2580000003 HC RX 258: Performed by: INTERNAL MEDICINE

## 2024-07-08 PROCEDURE — 6370000000 HC RX 637 (ALT 250 FOR IP): Performed by: STUDENT IN AN ORGANIZED HEALTH CARE EDUCATION/TRAINING PROGRAM

## 2024-07-08 PROCEDURE — 6370000000 HC RX 637 (ALT 250 FOR IP): Performed by: INTERNAL MEDICINE

## 2024-07-08 PROCEDURE — 99232 SBSQ HOSP IP/OBS MODERATE 35: CPT | Performed by: STUDENT IN AN ORGANIZED HEALTH CARE EDUCATION/TRAINING PROGRAM

## 2024-07-08 PROCEDURE — 84100 ASSAY OF PHOSPHORUS: CPT

## 2024-07-08 PROCEDURE — 96374 THER/PROPH/DIAG INJ IV PUSH: CPT

## 2024-07-08 PROCEDURE — 96375 TX/PRO/DX INJ NEW DRUG ADDON: CPT

## 2024-07-08 PROCEDURE — 84443 ASSAY THYROID STIM HORMONE: CPT

## 2024-07-08 PROCEDURE — 36415 COLL VENOUS BLD VENIPUNCTURE: CPT

## 2024-07-08 PROCEDURE — 96376 TX/PRO/DX INJ SAME DRUG ADON: CPT

## 2024-07-08 PROCEDURE — 93010 ELECTROCARDIOGRAM REPORT: CPT | Performed by: INTERNAL MEDICINE

## 2024-07-08 PROCEDURE — 96372 THER/PROPH/DIAG INJ SC/IM: CPT

## 2024-07-08 RX ORDER — SODIUM CHLORIDE 0.9 % (FLUSH) 0.9 %
5-40 SYRINGE (ML) INJECTION EVERY 12 HOURS SCHEDULED
Status: DISCONTINUED | OUTPATIENT
Start: 2024-07-08 | End: 2024-07-09 | Stop reason: HOSPADM

## 2024-07-08 RX ORDER — LEVETIRACETAM 500 MG/1
750 TABLET ORAL 2 TIMES DAILY
Status: DISCONTINUED | OUTPATIENT
Start: 2024-07-08 | End: 2024-07-08

## 2024-07-08 RX ORDER — LORAZEPAM 2 MG/ML
3 INJECTION INTRAMUSCULAR
Status: DISCONTINUED | OUTPATIENT
Start: 2024-07-08 | End: 2024-07-09 | Stop reason: HOSPADM

## 2024-07-08 RX ORDER — LEVETIRACETAM 1000 MG/1
1000 TABLET ORAL 2 TIMES DAILY
Qty: 60 TABLET | Refills: 3 | Status: SHIPPED | OUTPATIENT
Start: 2024-07-08

## 2024-07-08 RX ORDER — LANOLIN ALCOHOL/MO/W.PET/CERES
400 CREAM (GRAM) TOPICAL DAILY
Qty: 7 TABLET | Refills: 0 | Status: SHIPPED | OUTPATIENT
Start: 2024-07-08 | End: 2024-07-15

## 2024-07-08 RX ORDER — SODIUM CHLORIDE 0.9 % (FLUSH) 0.9 %
5-40 SYRINGE (ML) INJECTION PRN
Status: DISCONTINUED | OUTPATIENT
Start: 2024-07-08 | End: 2024-07-09 | Stop reason: HOSPADM

## 2024-07-08 RX ORDER — PROCHLORPERAZINE EDISYLATE 5 MG/ML
10 INJECTION INTRAMUSCULAR; INTRAVENOUS ONCE
Status: COMPLETED | OUTPATIENT
Start: 2024-07-08 | End: 2024-07-08

## 2024-07-08 RX ORDER — LANOLIN ALCOHOL/MO/W.PET/CERES
400 CREAM (GRAM) TOPICAL DAILY
Status: DISCONTINUED | OUTPATIENT
Start: 2024-07-08 | End: 2024-07-09 | Stop reason: HOSPADM

## 2024-07-08 RX ORDER — LORAZEPAM 2 MG/ML
4 INJECTION INTRAMUSCULAR
Status: DISCONTINUED | OUTPATIENT
Start: 2024-07-08 | End: 2024-07-09 | Stop reason: HOSPADM

## 2024-07-08 RX ORDER — SODIUM CHLORIDE 9 MG/ML
INJECTION, SOLUTION INTRAVENOUS PRN
Status: DISCONTINUED | OUTPATIENT
Start: 2024-07-08 | End: 2024-07-09 | Stop reason: HOSPADM

## 2024-07-08 RX ORDER — POTASSIUM CHLORIDE 20 MEQ/1
40 TABLET, EXTENDED RELEASE ORAL ONCE
Status: COMPLETED | OUTPATIENT
Start: 2024-07-08 | End: 2024-07-08

## 2024-07-08 RX ORDER — LORAZEPAM 2 MG/ML
2 INJECTION INTRAMUSCULAR
Status: DISCONTINUED | OUTPATIENT
Start: 2024-07-08 | End: 2024-07-09 | Stop reason: HOSPADM

## 2024-07-08 RX ORDER — POTASSIUM CHLORIDE 20 MEQ/1
40 TABLET, EXTENDED RELEASE ORAL DAILY
Qty: 4 TABLET | Refills: 0 | Status: SHIPPED | OUTPATIENT
Start: 2024-07-08 | End: 2024-07-10

## 2024-07-08 RX ORDER — LORAZEPAM 2 MG/ML
1 INJECTION INTRAMUSCULAR
Status: DISCONTINUED | OUTPATIENT
Start: 2024-07-08 | End: 2024-07-09 | Stop reason: HOSPADM

## 2024-07-08 RX ORDER — LEVETIRACETAM 500 MG/1
1000 TABLET ORAL 2 TIMES DAILY
Status: DISCONTINUED | OUTPATIENT
Start: 2024-07-08 | End: 2024-07-09 | Stop reason: HOSPADM

## 2024-07-08 RX ORDER — LORAZEPAM 1 MG/1
4 TABLET ORAL
Status: DISCONTINUED | OUTPATIENT
Start: 2024-07-08 | End: 2024-07-09 | Stop reason: HOSPADM

## 2024-07-08 RX ORDER — SODIUM CHLORIDE 9 MG/ML
INJECTION, SOLUTION INTRAVENOUS CONTINUOUS
Status: DISCONTINUED | OUTPATIENT
Start: 2024-07-08 | End: 2024-07-09 | Stop reason: HOSPADM

## 2024-07-08 RX ORDER — ACEBUTOLOL HYDROCHLORIDE 200 MG/1
200 CAPSULE ORAL 2 TIMES DAILY
COMMUNITY

## 2024-07-08 RX ORDER — LORAZEPAM 1 MG/1
2 TABLET ORAL
Status: DISCONTINUED | OUTPATIENT
Start: 2024-07-08 | End: 2024-07-09 | Stop reason: HOSPADM

## 2024-07-08 RX ORDER — LANOLIN ALCOHOL/MO/W.PET/CERES
100 CREAM (GRAM) TOPICAL DAILY
Status: DISCONTINUED | OUTPATIENT
Start: 2024-07-08 | End: 2024-07-09 | Stop reason: HOSPADM

## 2024-07-08 RX ORDER — LANOLIN ALCOHOL/MO/W.PET/CERES
100 CREAM (GRAM) TOPICAL DAILY
Qty: 30 TABLET | Refills: 3 | Status: SHIPPED | OUTPATIENT
Start: 2024-07-08

## 2024-07-08 RX ORDER — LORAZEPAM 1 MG/1
1 TABLET ORAL
Status: DISCONTINUED | OUTPATIENT
Start: 2024-07-08 | End: 2024-07-09 | Stop reason: HOSPADM

## 2024-07-08 RX ORDER — LORAZEPAM 1 MG/1
3 TABLET ORAL
Status: DISCONTINUED | OUTPATIENT
Start: 2024-07-08 | End: 2024-07-09 | Stop reason: HOSPADM

## 2024-07-08 RX ADMIN — LISINOPRIL 5 MG: 10 TABLET ORAL at 08:52

## 2024-07-08 RX ADMIN — BICTEGRAVIR SODIUM, EMTRICITABINE, AND TENOFOVIR ALAFENAMIDE FUMARATE 1 TABLET: 50; 200; 25 TABLET ORAL at 08:42

## 2024-07-08 RX ADMIN — ENOXAPARIN SODIUM 40 MG: 100 INJECTION SUBCUTANEOUS at 08:43

## 2024-07-08 RX ADMIN — PROCHLORPERAZINE EDISYLATE 10 MG: 5 INJECTION INTRAMUSCULAR; INTRAVENOUS at 07:07

## 2024-07-08 RX ADMIN — DIVALPROEX SODIUM 500 MG: 500 TABLET, DELAYED RELEASE ORAL at 08:42

## 2024-07-08 RX ADMIN — Medication 100 MG: at 15:04

## 2024-07-08 RX ADMIN — DIVALPROEX SODIUM 500 MG: 500 TABLET, DELAYED RELEASE ORAL at 19:43

## 2024-07-08 RX ADMIN — SODIUM CHLORIDE, PRESERVATIVE FREE 5 ML: 5 INJECTION INTRAVENOUS at 09:07

## 2024-07-08 RX ADMIN — Medication 3 MG: at 19:43

## 2024-07-08 RX ADMIN — POTASSIUM CHLORIDE 40 MEQ: 1500 TABLET, EXTENDED RELEASE ORAL at 15:04

## 2024-07-08 RX ADMIN — LEVETIRACETAM 500 MG: 500 TABLET, FILM COATED ORAL at 08:42

## 2024-07-08 RX ADMIN — LORAZEPAM 3 MG: 1 TABLET ORAL at 19:42

## 2024-07-08 RX ADMIN — LORAZEPAM 1 MG: 2 INJECTION INTRAMUSCULAR; INTRAVENOUS at 08:43

## 2024-07-08 RX ADMIN — Medication 400 MG: at 15:04

## 2024-07-08 RX ADMIN — SODIUM CHLORIDE, PRESERVATIVE FREE 5 ML: 5 INJECTION INTRAVENOUS at 09:06

## 2024-07-08 RX ADMIN — LORAZEPAM 2 MG: 2 INJECTION INTRAMUSCULAR; INTRAVENOUS at 16:33

## 2024-07-08 RX ADMIN — SODIUM CHLORIDE, PRESERVATIVE FREE 10 ML: 5 INJECTION INTRAVENOUS at 01:38

## 2024-07-08 RX ADMIN — SODIUM CHLORIDE: 9 INJECTION, SOLUTION INTRAVENOUS at 15:04

## 2024-07-08 RX ADMIN — METOPROLOL TARTRATE 25 MG: 25 TABLET, FILM COATED ORAL at 19:42

## 2024-07-08 RX ADMIN — METOPROLOL TARTRATE 25 MG: 25 TABLET, FILM COATED ORAL at 09:21

## 2024-07-08 RX ADMIN — LEVETIRACETAM 1000 MG: 500 TABLET, FILM COATED ORAL at 19:43

## 2024-07-08 ASSESSMENT — PAIN SCALES - GENERAL
PAINLEVEL_OUTOF10: 0

## 2024-07-08 NOTE — ED PROVIDER NOTES
was made to ensure accuracy; however, inadvertent computerized transcription errors may be present       Lulu Gandhi DO  07/08/24 9929

## 2024-07-08 NOTE — PROGRESS NOTES
Patient came to ED 33 without IV access.  Attempted to insert IV without success.  Patient insisting he needs IV team.

## 2024-07-08 NOTE — H&P
abnormality of the visualized skull or soft tissues.     No acute intracranial abnormality.           Assessment and Plan  Patient is a 38 y.o. male who presented with seizure.   The active problem list is as follows:    Principal Problem:    Seizure (HCC)  Active Problems:    Primary hypertension    Asthma    PVC's (premature ventricular contractions)    Smoker  Resolved Problems:    * No resolved hospital problems. *    Breakthrough seizure- seizure precautions. Keppra and depakote. Neuro.   Alcohol abuse and intoxication- thiamine, folate, ethanol 364 on presentation to ER... from [facility]??  Asthma  Hypertension  HIV- on biktarvy      Routine labs in the morning.  DVT prophylaxis.  Please see orders for further management and care.  If there are any questions after 7am, please contact [facility] Hospitalist that is assuming care.      Additional work up or/and treatment plan may be added today or thereafter based on clinical progression. I am managing admission portion of patient care. Some medical issues are handled by other specialists. Additional work up and treatment should be done by my colleague hospitalist who assumes care.       Nava Melendez,     10:35 PM  7/7/2024

## 2024-07-08 NOTE — PROGRESS NOTES
Hospitalist Progress Note      Chief Complaint:  seizure like activity     Admission Date: 2024     SYNOPSIS:Alfonso presents to the ER with seizure.  He does have a seizure history.  He does take Depakote and Keppra.  He had a seizure today at the recovery center and went to [facility] this morning.  He also had a seizure yesterday at rehab as well.  He again had a seizure after discharged from [facility] today.  Ethanol 364 on presentation to the ER today.  CT head with no acute process. ER Course routine labwork was performed which revealed ALT 62, , ethanol 364. /105, tachycardic 109.  The patient received Keppra in the emergency room and was admitted to [facility].    SUBJECTIVE:    Patient was seen this morning on ER, c/o headache, he received ativan 1 mg IV around 830 for CIWA of 10  Denies CP, abdominal pain, nausea, vomiting  Shaking hands   Records reviewed.     Stable overnight. No overnight issues reported     Temp (24hrs), Av.3 °F (36.8 °C), Min:98 °F (36.7 °C), Max:98.6 °F (37 °C)    DIET: ADULT DIET; Regular  CODE: Full Code  No intake or output data in the 24 hours ending 24 6483    OBJECTIVE:    /81   Pulse 81   Temp 98 °F (36.7 °C)   Resp 20   Wt 54.4 kg (120 lb)   SpO2 94%   BMI 20.60 kg/m²     General appearance: No apparent distress, appears stated age and cooperative  HEENT:  Normocephalic. Conjunctivae/corneas clear. Moist mucosa   Neck: Supple. No jugular venous distention. Thyroid not visible, non tender   Respiratory: Normal respiratory effort. Clear to auscultation bilaterally. No stridor/wheezing/rhonchi/crackles   Cardiovascular: Regular heart beats, normal S1-S2. No M/G/R  Abdomen: Non distended, soft, non tender, no visceromegaly, no mass, normal bowel sounds   Musculoskeletal: No clubbing, cyanosis, no bilateral lower extremity edema. Brisk capillary refill.   Skin:  No rashes  on visible skin  Neurologic: awake, alert and oriented

## 2024-07-09 VITALS
OXYGEN SATURATION: 100 % | SYSTOLIC BLOOD PRESSURE: 134 MMHG | HEIGHT: 64 IN | RESPIRATION RATE: 17 BRPM | WEIGHT: 117.5 LBS | HEART RATE: 112 BPM | TEMPERATURE: 97.9 F | DIASTOLIC BLOOD PRESSURE: 98 MMHG | BODY MASS INDEX: 20.06 KG/M2

## 2024-07-09 PROBLEM — E44.0 MODERATE PROTEIN-CALORIE MALNUTRITION (HCC): Chronic | Status: ACTIVE | Noted: 2024-07-09

## 2024-07-09 PROCEDURE — 6370000000 HC RX 637 (ALT 250 FOR IP): Performed by: INTERNAL MEDICINE

## 2024-07-09 PROCEDURE — 96372 THER/PROPH/DIAG INJ SC/IM: CPT

## 2024-07-09 PROCEDURE — 6370000000 HC RX 637 (ALT 250 FOR IP): Performed by: STUDENT IN AN ORGANIZED HEALTH CARE EDUCATION/TRAINING PROGRAM

## 2024-07-09 PROCEDURE — 99239 HOSP IP/OBS DSCHRG MGMT >30: CPT | Performed by: INTERNAL MEDICINE

## 2024-07-09 PROCEDURE — 2580000003 HC RX 258: Performed by: INTERNAL MEDICINE

## 2024-07-09 PROCEDURE — G0378 HOSPITAL OBSERVATION PER HR: HCPCS

## 2024-07-09 PROCEDURE — 6360000002 HC RX W HCPCS: Performed by: INTERNAL MEDICINE

## 2024-07-09 PROCEDURE — 96375 TX/PRO/DX INJ NEW DRUG ADDON: CPT

## 2024-07-09 RX ADMIN — LORAZEPAM 2 MG: 1 TABLET ORAL at 15:21

## 2024-07-09 RX ADMIN — DIVALPROEX SODIUM 500 MG: 500 TABLET, DELAYED RELEASE ORAL at 10:22

## 2024-07-09 RX ADMIN — BICTEGRAVIR SODIUM, EMTRICITABINE, AND TENOFOVIR ALAFENAMIDE FUMARATE 1 TABLET: 50; 200; 25 TABLET ORAL at 10:21

## 2024-07-09 RX ADMIN — LORAZEPAM 2 MG: 1 TABLET ORAL at 10:35

## 2024-07-09 RX ADMIN — Medication 400 MG: at 10:35

## 2024-07-09 RX ADMIN — ENOXAPARIN SODIUM 40 MG: 100 INJECTION SUBCUTANEOUS at 10:21

## 2024-07-09 RX ADMIN — SODIUM CHLORIDE, PRESERVATIVE FREE 10 ML: 5 INJECTION INTRAVENOUS at 10:22

## 2024-07-09 RX ADMIN — LORAZEPAM 4 MG: 1 TABLET ORAL at 04:35

## 2024-07-09 RX ADMIN — LEVETIRACETAM 1000 MG: 500 TABLET, FILM COATED ORAL at 10:22

## 2024-07-09 RX ADMIN — Medication 100 MG: at 10:22

## 2024-07-09 RX ADMIN — LISINOPRIL 5 MG: 10 TABLET ORAL at 10:22

## 2024-07-09 RX ADMIN — VENLAFAXINE HYDROCHLORIDE 75 MG: 75 CAPSULE, EXTENDED RELEASE ORAL at 10:29

## 2024-07-09 RX ADMIN — HYDRALAZINE HYDROCHLORIDE 10 MG: 20 INJECTION INTRAMUSCULAR; INTRAVENOUS at 10:35

## 2024-07-09 RX ADMIN — METOPROLOL TARTRATE 25 MG: 25 TABLET, FILM COATED ORAL at 10:22

## 2024-07-09 ASSESSMENT — LIFESTYLE VARIABLES
HOW MANY STANDARD DRINKS CONTAINING ALCOHOL DO YOU HAVE ON A TYPICAL DAY: 5 OR 6
HOW OFTEN DO YOU HAVE A DRINK CONTAINING ALCOHOL: 4 OR MORE TIMES A WEEK

## 2024-07-09 NOTE — PROGRESS NOTES
Comprehensive Nutrition Assessment    Type and Reason for Visit:  Initial, Positive Nutrition Screen    Nutrition Recommendations/Plan:   Recommend and start [employer] nutritional supplement BID to help meet nutritional needs.    Patient has history of following a vegetarian diet. Will modify diet if appropriate.         Malnutrition Assessment:  Malnutrition Status:  Moderate malnutrition (07/09/24 1044)    Context:  Chronic Illness     Findings of the 6 clinical characteristics of malnutrition:  Energy Intake:  75% or less estimated energy requirements for 1 month or longer  Weight Loss:  Unable to assess (d/t lack of recent actual weight history)     Body Fat Loss:  Mild body fat loss Orbital, Triceps   Muscle Mass Loss:  Mild muscle mass loss Temples (temporalis), Clavicles (pectoralis & deltoids)  Fluid Accumulation:  No significant fluid accumulation     Strength:  Not Performed    Nutrition Assessment:    Patient adm [facility] s/p breakthrough seizure ; noted alcohol abuse ; recent seizures ; hx of HIV/seizures/ETOH abuse ; hx of asthma/hypertension/tobacco abuse ; pt also meets criteria for moderate malnutrition ; noted decreased po intake/appetite PTA ; pt with history of vegetarian diet ; will provide recommendations    Nutrition Related Findings:    I&Os WNL, no edema, A&O x 4, muscle/fat wasting, decreased appetite PTA, elevated liver enzymes ; Wound Type: None       Current Nutrition Intake & Therapies:    Average Meal Intake: 26-50%     ADULT DIET; Regular    Anthropometric Measures:  Height: 162.6 cm (5' 4\")  Ideal Body Weight (IBW): 130 lbs (59 kg)       Current Body Weight: 53.1 kg (117 lb) (7/8, actual), 90 % IBW.    Current BMI (kg/m2): 20.1  Usual Body Weight:  (UTO ; EMR shows past weights of 130# no method on 3/1/24 and 135# actual on 2/3/23)                       BMI Categories: Normal Weight (BMI 18.5-24.9)    Estimated Daily Nutrient Needs:  Energy Requirements Based On:

## 2024-07-09 NOTE — PROGRESS NOTES
[facility]    Admitting Date and Time: 7/7/2024  6:56 PM  Admit Dx: Seizure (HCC) [R56.9]  Synopsis: Alfonso presents to the ER with seizure.  He does have a seizure history.  He does take Depakote and Keppra.  He had a seizure today at the recovery center and went to [facility] this morning.  He also had a seizure yesterday at rehab as well.  He again had a seizure after discharged from [facility] today.  Ethanol 364 on presentation to the ER today.  CT head with no acute process. ER Course routine labwork was performed which revealed ALT 62, , ethanol 364. /105, tachycardic 109.  The patient received Keppra in the emergency room and was admitted to [facility].     Subjective:  Patient seen and examined at bedside  Denies any new seizure-like activity overnight  Asking for cigarettes    ROS: denies fever, chills, cp, sob, n/v, HA unless stated above.      nicotine  1 patch TransDERmal Daily    sodium chloride flush  5-40 mL IntraVENous 2 times per day    sodium chloride 0.9 % 1,000 mL with folic acid 1 mg, adult multi-vitamin with vitamin k 10 mL, thiamine 100 mg   IntraVENous Once    levETIRAcetam  1,000 mg Oral BID    thiamine  100 mg Oral Daily    magnesium oxide  400 mg Oral Daily    sodium chloride flush  5-40 mL IntraVENous 2 times per day    enoxaparin  40 mg SubCUTAneous Daily    bictegravir-emtricitab-tenofovir alafenamide  1 tablet Oral Daily    divalproex  500 mg Oral BID    lisinopril  5 mg Oral Daily    metoprolol tartrate  25 mg Oral BID    venlafaxine  75 mg Oral Daily     sodium chloride flush, 5-40 mL, PRN  sodium chloride, , PRN  LORazepam, 1 mg, Q1H PRN   Or  LORazepam, 1 mg, Q1H PRN   Or  LORazepam, 2 mg, Q1H PRN   Or  LORazepam, 2 mg, Q1H PRN   Or  LORazepam, 3 mg, Q1H PRN   Or  LORazepam, 3 mg, Q1H PRN   Or  LORazepam, 4 mg, Q1H PRN   Or  LORazepam, 4 mg, Q1H PRN  benzocaine-menthol, 1 lozenge, Q2H PRN  benzonatate, 100 mg, TID

## 2024-07-09 NOTE — PLAN OF CARE
Problem: Discharge Planning  Goal: Discharge to home or other facility with appropriate resources  Outcome: Progressing  Flowsheets (Taken 7/8/2024 2237)  Discharge to home or other facility with appropriate resources:   Identify barriers to discharge with patient and caregiver   Arrange for needed discharge resources and transportation as appropriate   Identify discharge learning needs (meds, wound care, etc)   Arrange for interpreters to assist at discharge as needed   Refer to discharge planning if patient needs post-hospital services based on physician order or complex needs related to functional status, cognitive ability or social support system     Problem: Pain  Goal: Verbalizes/displays adequate comfort level or baseline comfort level  Outcome: Progressing  Flowsheets (Taken 7/8/2024 2237)  Verbalizes/displays adequate comfort level or baseline comfort level:   Encourage patient to monitor pain and request assistance   Assess pain using appropriate pain scale   Administer analgesics based on type and severity of pain and evaluate response   Implement non-pharmacological measures as appropriate and evaluate response     Problem: Safety - Adult  Goal: Free from fall injury  Outcome: Progressing  Flowsheets (Taken 7/8/2024 2237)  Free From Fall Injury:   Instruct family/caregiver on patient safety   Based on caregiver fall risk screen, instruct family/caregiver to ask for assistance with transferring infant if caregiver noted to have fall risk factors     Problem: Risk for Elopement  Goal: Patient will not exit the unit/facility without proper excort  Outcome: Progressing

## 2024-07-09 NOTE — DISCHARGE SUMMARY
[facility]       No follow-up provider specified.    Activity level: As tolerated     Dispo: Home      Condition on discharge: Stable     Patient ID:  Alfonso Ospina  [id number]  38 y.o.  1985    Admit date: 7/7/2024    Discharge date and time:  7/9/2024  2:05 PM    Admission Diagnoses: Principal Problem:    Seizure (HCC)  Active Problems:    Primary hypertension    Asthma    PVC's (premature ventricular contractions)    Smoker    Moderate protein-calorie malnutrition (HCC)  Resolved Problems:    * No resolved hospital problems. *      Discharge Diagnoses: Principal Problem:    Seizure (HCC)  Active Problems:    Primary hypertension    Asthma    PVC's (premature ventricular contractions)    Smoker    Moderate protein-calorie malnutrition (HCC)  Resolved Problems:    * No resolved hospital problems. *      Consults:  IP CONSULT TO SOCIAL WORK  IP CONSULT TO NEUROLOGY    Hospital Course:   Patient Alfonso Ospina is a 38 y.o. presented with Seizure (HCC) [R56.9]  Synopsis: Alfonso presents to the ER with seizure.  He does have a seizure history.  He does take Depakote and Keppra.  He had a seizure today at the recovery center and went to [facility] this morning.  He also had a seizure yesterday at rehab as well.  He again had a seizure after discharged from [facility] today.  Ethanol 364 on presentation to the ER today.  CT head with no acute process. ER Course routine labwork was performed which revealed ALT 62, , ethanol 364. /105, tachycardic 109.  The patient received Keppra in the emergency room and was admitted to [facility]. Patient's seizure likely associated with continuous alcohol intake. Patient's keppra was increased to 1000 mg BID and depakote continued at current dose. Discussed with neurology. Advised to cut back on alcohol. Patient to be discharged to [facility].     Discharge Exam:  General Appearance: alert and oriented to

## 2024-07-09 NOTE — CARE COORDINATION
Peer Recovery Support Note       Name:  Alfonso Ospina   Date:    7/9/2024        Chief Complaint   Patient presents with    Seizures     Patient sent from [facility] with seizure activity , patient discharged from [facility] for same            Peer Support met with patient.  [x] Support and education provided  [] Resources provided   [] Treatment referral:   [] Other:   [] Patient declined peer recovery services      Referred By: Care Team     Notes:  Peer met with patient. Patient appeared to be groggy and sleepy. Patient mentioned that he wants to return to [facility] to continue to work on his alcoholism upon discharge. Peer encouraged patient to follow doctor's orders. Peer will follow up with patient.  
 Patient  presented to [facility] with seizure. Has h/o HIV and alcohol abuse.  Has h/o seizures and is on depakote. Met with patient at bedside to discuss transition of care. He lives with his boyfriend in a house in [place]. He plans to go to [facility]. When medically cleared. [facility] called. Per Nava at [facility] she has accepted and will need all information faxed to her at 1 [phone]. Please notify her of discharge at / admissions.  Will need [facility] called at  for transport. CM/SW will continue to follow .Discharge order noted Transport to: [address] [place] [address]  Reason for transport: disharged  Transport set up with: [facility]  [employer]  noime/Date: 7/9 at 330  Nurse/CM notified: yes    t:     Case Management Assessment  Initial Evaluation    Date/Time of Evaluation: 7/9/2024 9:55 AM  Assessment Completed by: Zeina Ribeiro RN    If patient is discharged prior to next notation, then this note serves as note for discharge by case management.    Patient Name: Alfonso Ospina                   YOB: 1985  Diagnosis: Seizure (HCC) [R56.9]                   Date / Time: 7/7/2024  6:56 PM    Patient Admission Status: Observation   Readmission Risk (Low < 19, Mod (19-27), High > 27): Readmission Risk Score: 7.2    Current PCP: Carli Maki PA  PCP verified by CM? Yes    Chart Reviewed: Yes      History Provided by: Patient  Patient Orientation: Alert and Oriented, Person, Place, Situation, Self    Patient Cognition: Alert    Hospitalization in the last 30 days (Readmission):  No    If yes, Readmission Assessment in  Navigator will be completed.    Advance Directives:      Code Status: Full Code   Patient's Primary Decision Maker is: Legal Next of Kin      Discharge Planning:    Patient lives with: Spouse/Significant Other, Family Members Type of Home: House  Primary Care Giver: Self  Patient Support Systems include: Spouse/Significant Other   Current 
[R56.9]    IF APPLICABLE: The Patient and/or patient representative Alfonso and his family were provided with a choice of provider and agrees with the discharge plan. Freedom of choice list with basic dialogue that supports the patient's individualized plan of care/goals and shares the quality data associated with the providers was provided to:     Patient Representative Name:       The Patient and/or Patient Representative Agree with the Discharge Plan?      Zeina Ribeiro RN  Case Management Department  Ph:   [phone] Fax: [phone]

## 2024-07-09 NOTE — PROGRESS NOTES
CLINICAL PHARMACY NOTE: MEDS TO BEDS    Total # of Prescriptions Filled: 4   The following medications were delivered to the patient:  Potassium chloride emperatriz er 20  Thiamine mono 100 mg  Levetiracetam 1000 mg  Magnesium oxide 400    Additional Documentation:   Delivered to pt in room

## 2024-09-20 ENCOUNTER — HOSPITAL ENCOUNTER (EMERGENCY)
Age: 39
Discharge: HOME | End: 2024-09-21
Attending: EMERGENCY MEDICINE
Payer: MEDICAID

## 2024-09-20 ENCOUNTER — APPOINTMENT (OUTPATIENT)
Dept: CT IMAGING | Age: 39
End: 2024-09-20
Attending: EMERGENCY MEDICINE
Payer: MEDICAID

## 2024-09-20 ENCOUNTER — APPOINTMENT (OUTPATIENT)
Dept: GENERAL RADIOLOGY | Age: 39
End: 2024-09-20
Payer: MEDICAID

## 2024-09-20 DIAGNOSIS — F10.90 ALCOHOL USE DISORDER: ICD-10-CM

## 2024-09-20 DIAGNOSIS — G40.919 BREAKTHROUGH SEIZURE (HCC): Primary | ICD-10-CM

## 2024-09-20 LAB
ALBUMIN SERPL-MCNC: 3.8 G/DL (ref 3.5–5.2)
ALP SERPL-CCNC: 63 U/L (ref 40–129)
ALT SERPL-CCNC: 12 U/L (ref 0–40)
AMPHET UR QL SCN: NEGATIVE
ANION GAP SERPL CALCULATED.3IONS-SCNC: 15 MMOL/L (ref 7–16)
APAP SERPL-MCNC: <5 UG/ML (ref 10–30)
AST SERPL-CCNC: 18 U/L (ref 0–39)
BARBITURATES UR QL SCN: NEGATIVE
BASOPHILS # BLD: 0.06 K/UL (ref 0–0.2)
BASOPHILS NFR BLD: 1 % (ref 0–2)
BENZODIAZ UR QL: NEGATIVE
BILIRUB SERPL-MCNC: 0.2 MG/DL (ref 0–1.2)
BUN SERPL-MCNC: 21 MG/DL (ref 6–20)
BUPRENORPHINE UR QL: NEGATIVE
CALCIUM SERPL-MCNC: 7.7 MG/DL (ref 8.6–10.2)
CANNABINOIDS UR QL SCN: POSITIVE
CHLORIDE SERPL-SCNC: 107 MMOL/L (ref 98–107)
CO2 SERPL-SCNC: 23 MMOL/L (ref 22–29)
COCAINE UR QL SCN: NEGATIVE
CREAT SERPL-MCNC: 0.8 MG/DL (ref 0.7–1.2)
DATE LAST DOSE: ABNORMAL
EKG ATRIAL RATE: 74 BPM
EKG P AXIS: 55 DEGREES
EKG P-R INTERVAL: 116 MS
EKG Q-T INTERVAL: 406 MS
EKG QRS DURATION: 104 MS
EKG QTC CALCULATION (BAZETT): 450 MS
EKG R AXIS: 78 DEGREES
EKG T AXIS: 44 DEGREES
EKG VENTRICULAR RATE: 74 BPM
EOSINOPHIL # BLD: 0.03 K/UL (ref 0.05–0.5)
EOSINOPHILS RELATIVE PERCENT: 0 % (ref 0–6)
ERYTHROCYTE [DISTWIDTH] IN BLOOD BY AUTOMATED COUNT: 11.4 % (ref 11.5–15)
ETHANOLAMINE SERPL-MCNC: 286 MG/DL (ref 0–0.08)
FENTANYL UR QL: NEGATIVE
GFR, ESTIMATED: >90 ML/MIN/1.73M2
GLUCOSE SERPL-MCNC: 75 MG/DL (ref 74–99)
HCT VFR BLD AUTO: 48.1 % (ref 37–54)
HGB BLD-MCNC: 16.4 G/DL (ref 12.5–16.5)
IMM GRANULOCYTES # BLD AUTO: <0.03 K/UL (ref 0–0.58)
IMM GRANULOCYTES NFR BLD: 0 % (ref 0–5)
LACTATE BLDV-SCNC: 3.4 MMOL/L (ref 0.5–2.2)
LACTATE BLDV-SCNC: 3.8 MMOL/L (ref 0.5–2.2)
LYMPHOCYTES NFR BLD: 3.74 K/UL (ref 1.5–4)
LYMPHOCYTES RELATIVE PERCENT: 52 % (ref 20–42)
MAGNESIUM SERPL-MCNC: 1.9 MG/DL (ref 1.6–2.6)
MCH RBC QN AUTO: 31.8 PG (ref 26–35)
MCHC RBC AUTO-ENTMCNC: 34.1 G/DL (ref 32–34.5)
MCV RBC AUTO: 93.2 FL (ref 80–99.9)
METHADONE UR QL: NEGATIVE
MONOCYTES NFR BLD: 0.3 K/UL (ref 0.1–0.95)
MONOCYTES NFR BLD: 4 % (ref 2–12)
NEUTROPHILS NFR BLD: 43 % (ref 43–80)
NEUTS SEG NFR BLD: 3.11 K/UL (ref 1.8–7.3)
OPIATES UR QL SCN: NEGATIVE
OXYCODONE UR QL SCN: NEGATIVE
PCP UR QL SCN: NEGATIVE
PLATELET # BLD AUTO: 268 K/UL (ref 130–450)
PMV BLD AUTO: 9.4 FL (ref 7–12)
POTASSIUM SERPL-SCNC: 3.8 MMOL/L (ref 3.5–5)
PROT SERPL-MCNC: 5.9 G/DL (ref 6.4–8.3)
RBC # BLD AUTO: 5.16 M/UL (ref 3.8–5.8)
SALICYLATES SERPL-MCNC: <0.3 MG/DL (ref 0–30)
SARS-COV-2 RDRP RESP QL NAA+PROBE: NOT DETECTED
SODIUM SERPL-SCNC: 145 MMOL/L (ref 132–146)
SPECIMEN DESCRIPTION: NORMAL
TEST INFORMATION: ABNORMAL
TME LAST DOSE: ABNORMAL H
TOXIC TRICYCLIC SC,BLOOD: NEGATIVE
VALPROATE SERPL-MCNC: 6 UG/ML (ref 50–100)
VANCOMYCIN DOSE: ABNORMAL MG
WBC OTHER # BLD: 7.3 K/UL (ref 4.5–11.5)

## 2024-09-20 PROCEDURE — 6360000002 HC RX W HCPCS: Performed by: EMERGENCY MEDICINE

## 2024-09-20 PROCEDURE — 2580000003 HC RX 258: Performed by: EMERGENCY MEDICINE

## 2024-09-20 PROCEDURE — 93005 ELECTROCARDIOGRAM TRACING: CPT | Performed by: EMERGENCY MEDICINE

## 2024-09-20 PROCEDURE — 99285 EMERGENCY DEPT VISIT HI MDM: CPT

## 2024-09-20 PROCEDURE — 85025 COMPLETE CBC W/AUTO DIFF WBC: CPT

## 2024-09-20 PROCEDURE — 80143 DRUG ASSAY ACETAMINOPHEN: CPT

## 2024-09-20 PROCEDURE — 83735 ASSAY OF MAGNESIUM: CPT

## 2024-09-20 PROCEDURE — 71045 X-RAY EXAM CHEST 1 VIEW: CPT

## 2024-09-20 PROCEDURE — 87635 SARS-COV-2 COVID-19 AMP PRB: CPT

## 2024-09-20 PROCEDURE — 83605 ASSAY OF LACTIC ACID: CPT

## 2024-09-20 PROCEDURE — 80307 DRUG TEST PRSMV CHEM ANLYZR: CPT

## 2024-09-20 PROCEDURE — 80053 COMPREHEN METABOLIC PANEL: CPT

## 2024-09-20 PROCEDURE — G0480 DRUG TEST DEF 1-7 CLASSES: HCPCS

## 2024-09-20 PROCEDURE — 80179 DRUG ASSAY SALICYLATE: CPT

## 2024-09-20 PROCEDURE — 70450 CT HEAD/BRAIN W/O DYE: CPT

## 2024-09-20 PROCEDURE — 80164 ASSAY DIPROPYLACETIC ACD TOT: CPT

## 2024-09-20 PROCEDURE — 80177 DRUG SCRN QUAN LEVETIRACETAM: CPT

## 2024-09-20 PROCEDURE — 96374 THER/PROPH/DIAG INJ IV PUSH: CPT

## 2024-09-20 RX ORDER — LEVETIRACETAM 500 MG/5ML
1000 INJECTION, SOLUTION, CONCENTRATE INTRAVENOUS ONCE
Status: COMPLETED | OUTPATIENT
Start: 2024-09-20 | End: 2024-09-20

## 2024-09-20 RX ORDER — DIVALPROEX SODIUM 500 MG/1
500 TABLET, DELAYED RELEASE ORAL ONCE
Status: COMPLETED | OUTPATIENT
Start: 2024-09-20 | End: 2024-09-21

## 2024-09-20 RX ORDER — 0.9 % SODIUM CHLORIDE 0.9 %
1000 INTRAVENOUS SOLUTION INTRAVENOUS ONCE
Status: COMPLETED | OUTPATIENT
Start: 2024-09-20 | End: 2024-09-20

## 2024-09-20 RX ADMIN — SODIUM CHLORIDE 1000 ML: 9 INJECTION, SOLUTION INTRAVENOUS at 13:22

## 2024-09-20 RX ADMIN — LEVETIRACETAM 1000 MG: 100 INJECTION INTRAVENOUS at 13:21

## 2024-09-20 ASSESSMENT — PAIN - FUNCTIONAL ASSESSMENT: PAIN_FUNCTIONAL_ASSESSMENT: NONE - DENIES PAIN

## 2024-09-20 ASSESSMENT — LIFESTYLE VARIABLES
HOW OFTEN DO YOU HAVE A DRINK CONTAINING ALCOHOL: PATIENT DECLINED
HOW MANY STANDARD DRINKS CONTAINING ALCOHOL DO YOU HAVE ON A TYPICAL DAY: PATIENT DECLINED

## 2024-09-21 VITALS
WEIGHT: 118 LBS | SYSTOLIC BLOOD PRESSURE: 116 MMHG | BODY MASS INDEX: 20.25 KG/M2 | DIASTOLIC BLOOD PRESSURE: 68 MMHG | OXYGEN SATURATION: 97 % | RESPIRATION RATE: 16 BRPM | TEMPERATURE: 97.8 F | HEART RATE: 69 BPM

## 2024-09-21 LAB — LEVETIRACETAM SERPL-MCNC: <2 UG/ML

## 2024-09-21 PROCEDURE — 6370000000 HC RX 637 (ALT 250 FOR IP): Performed by: EMERGENCY MEDICINE

## 2024-09-21 RX ORDER — ACETAMINOPHEN 325 MG/1
650 TABLET ORAL ONCE
Status: COMPLETED | OUTPATIENT
Start: 2024-09-21 | End: 2024-09-21

## 2024-09-21 RX ADMIN — DIVALPROEX SODIUM 500 MG: 500 TABLET, DELAYED RELEASE ORAL at 00:01

## 2024-09-21 RX ADMIN — ACETAMINOPHEN 650 MG: 325 TABLET ORAL at 07:18

## 2024-09-21 RX ADMIN — ACETAMINOPHEN 650 MG: 325 TABLET ORAL at 00:20

## 2024-09-21 ASSESSMENT — PAIN DESCRIPTION - LOCATION
LOCATION: HEAD

## 2024-09-21 ASSESSMENT — PAIN DESCRIPTION - ORIENTATION
ORIENTATION: RIGHT;LEFT

## 2024-09-21 ASSESSMENT — PAIN SCALES - GENERAL
PAINLEVEL_OUTOF10: 8
PAINLEVEL_OUTOF10: 8
PAINLEVEL_OUTOF10: 5

## 2024-09-21 ASSESSMENT — PAIN DESCRIPTION - PAIN TYPE
TYPE: ACUTE PAIN
TYPE: ACUTE PAIN

## 2024-09-21 ASSESSMENT — PAIN DESCRIPTION - FREQUENCY
FREQUENCY: CONTINUOUS
FREQUENCY: CONTINUOUS

## 2024-09-21 ASSESSMENT — PAIN DESCRIPTION - DESCRIPTORS
DESCRIPTORS: ACHING
DESCRIPTORS: ACHING;THROBBING
DESCRIPTORS: ACHING

## 2024-09-21 ASSESSMENT — PAIN DESCRIPTION - ONSET
ONSET: PROGRESSIVE
ONSET: PROGRESSIVE

## 2024-09-23 LAB
EKG ATRIAL RATE: 74 BPM
EKG P AXIS: 55 DEGREES
EKG P-R INTERVAL: 116 MS
EKG Q-T INTERVAL: 406 MS
EKG QRS DURATION: 104 MS
EKG QTC CALCULATION (BAZETT): 450 MS
EKG R AXIS: 78 DEGREES
EKG T AXIS: 44 DEGREES
EKG VENTRICULAR RATE: 74 BPM

## 2024-09-23 PROCEDURE — 93010 ELECTROCARDIOGRAM REPORT: CPT | Performed by: INTERNAL MEDICINE

## 2024-12-17 ENCOUNTER — HOSPITAL ENCOUNTER (EMERGENCY)
Age: 39
Discharge: HOME | End: 2024-12-17
Attending: EMERGENCY MEDICINE
Payer: MEDICAID

## 2024-12-17 VITALS
SYSTOLIC BLOOD PRESSURE: 145 MMHG | RESPIRATION RATE: 18 BRPM | TEMPERATURE: 98.1 F | HEART RATE: 99 BPM | OXYGEN SATURATION: 97 % | DIASTOLIC BLOOD PRESSURE: 93 MMHG

## 2024-12-17 DIAGNOSIS — B37.81 CANDIDA ESOPHAGITIS (HCC): Primary | ICD-10-CM

## 2024-12-17 LAB
ALBUMIN SERPL-MCNC: 4.5 G/DL (ref 3.5–5.2)
ALP SERPL-CCNC: 64 U/L (ref 40–129)
ALT SERPL-CCNC: 82 U/L (ref 0–40)
ANION GAP SERPL CALCULATED.3IONS-SCNC: 18 MMOL/L (ref 7–16)
AST SERPL-CCNC: 153 U/L (ref 0–39)
BASOPHILS # BLD: 0.04 K/UL (ref 0–0.2)
BASOPHILS NFR BLD: 1 % (ref 0–2)
BILIRUB SERPL-MCNC: 0.5 MG/DL (ref 0–1.2)
BUN SERPL-MCNC: 14 MG/DL (ref 6–20)
CALCIUM SERPL-MCNC: 8.9 MG/DL (ref 8.6–10.2)
CHLORIDE SERPL-SCNC: 98 MMOL/L (ref 98–107)
CO2 SERPL-SCNC: 22 MMOL/L (ref 22–29)
CREAT SERPL-MCNC: 0.8 MG/DL (ref 0.7–1.2)
EOSINOPHIL # BLD: 0.05 K/UL (ref 0.05–0.5)
EOSINOPHILS RELATIVE PERCENT: 1 % (ref 0–6)
ERYTHROCYTE [DISTWIDTH] IN BLOOD BY AUTOMATED COUNT: 12.1 % (ref 11.5–15)
GFR, ESTIMATED: >90 ML/MIN/1.73M2
GLUCOSE SERPL-MCNC: 170 MG/DL (ref 74–99)
HCT VFR BLD AUTO: 40.5 % (ref 37–54)
HGB BLD-MCNC: 14 G/DL (ref 12.5–16.5)
IMM GRANULOCYTES # BLD AUTO: <0.03 K/UL (ref 0–0.58)
IMM GRANULOCYTES NFR BLD: 0 % (ref 0–5)
LYMPHOCYTES NFR BLD: 1.35 K/UL (ref 1.5–4)
LYMPHOCYTES RELATIVE PERCENT: 34 % (ref 20–42)
MCH RBC QN AUTO: 32.9 PG (ref 26–35)
MCHC RBC AUTO-ENTMCNC: 34.6 G/DL (ref 32–34.5)
MCV RBC AUTO: 95.1 FL (ref 80–99.9)
MONOCYTES NFR BLD: 0.44 K/UL (ref 0.1–0.95)
MONOCYTES NFR BLD: 11 % (ref 2–12)
NEUTROPHILS NFR BLD: 52 % (ref 43–80)
NEUTS SEG NFR BLD: 2.04 K/UL (ref 1.8–7.3)
PLATELET # BLD AUTO: 157 K/UL (ref 130–450)
PMV BLD AUTO: 9.3 FL (ref 7–12)
POTASSIUM SERPL-SCNC: 4.1 MMOL/L (ref 3.5–5)
PROT SERPL-MCNC: 7.1 G/DL (ref 6.4–8.3)
RBC # BLD AUTO: 4.26 M/UL (ref 3.8–5.8)
SODIUM SERPL-SCNC: 138 MMOL/L (ref 132–146)
WBC OTHER # BLD: 3.9 K/UL (ref 4.5–11.5)

## 2024-12-17 PROCEDURE — 80053 COMPREHEN METABOLIC PANEL: CPT

## 2024-12-17 PROCEDURE — 2580000003 HC RX 258: Performed by: EMERGENCY MEDICINE

## 2024-12-17 PROCEDURE — 96374 THER/PROPH/DIAG INJ IV PUSH: CPT

## 2024-12-17 PROCEDURE — 85025 COMPLETE CBC W/AUTO DIFF WBC: CPT

## 2024-12-17 PROCEDURE — 99284 EMERGENCY DEPT VISIT MOD MDM: CPT

## 2024-12-17 PROCEDURE — 6360000002 HC RX W HCPCS: Performed by: EMERGENCY MEDICINE

## 2024-12-17 PROCEDURE — 6370000000 HC RX 637 (ALT 250 FOR IP): Performed by: EMERGENCY MEDICINE

## 2024-12-17 RX ORDER — LEVETIRACETAM 500 MG/5ML
1000 INJECTION, SOLUTION, CONCENTRATE INTRAVENOUS ONCE
Status: COMPLETED | OUTPATIENT
Start: 2024-12-17 | End: 2024-12-17

## 2024-12-17 RX ORDER — FLUCONAZOLE 200 MG/1
200 TABLET ORAL DAILY
Qty: 14 TABLET | Refills: 0 | Status: SHIPPED | OUTPATIENT
Start: 2024-12-17 | End: 2024-12-31

## 2024-12-17 RX ORDER — DIVALPROEX SODIUM 500 MG/1
500 TABLET, DELAYED RELEASE ORAL ONCE
Status: COMPLETED | OUTPATIENT
Start: 2024-12-17 | End: 2024-12-17

## 2024-12-17 RX ORDER — FLUCONAZOLE 100 MG/1
400 TABLET ORAL ONCE
Status: COMPLETED | OUTPATIENT
Start: 2024-12-17 | End: 2024-12-17

## 2024-12-17 RX ORDER — 0.9 % SODIUM CHLORIDE 0.9 %
1000 INTRAVENOUS SOLUTION INTRAVENOUS ONCE
Status: COMPLETED | OUTPATIENT
Start: 2024-12-17 | End: 2024-12-17

## 2024-12-17 RX ADMIN — FLUCONAZOLE 400 MG: 100 TABLET ORAL at 13:49

## 2024-12-17 RX ADMIN — DIPHENHYDRAMINE HYDROCHLORIDE 10 ML: 12.5 LIQUID ORAL at 14:21

## 2024-12-17 RX ADMIN — SODIUM CHLORIDE 1000 ML: 9 INJECTION, SOLUTION INTRAVENOUS at 12:11

## 2024-12-17 RX ADMIN — DIVALPROEX SODIUM 500 MG: 500 TABLET, DELAYED RELEASE ORAL at 12:14

## 2024-12-17 RX ADMIN — LEVETIRACETAM 1000 MG: 100 INJECTION INTRAVENOUS at 12:12

## 2024-12-17 ASSESSMENT — LIFESTYLE VARIABLES
HOW MANY STANDARD DRINKS CONTAINING ALCOHOL DO YOU HAVE ON A TYPICAL DAY: 3 OR 4
HOW OFTEN DO YOU HAVE A DRINK CONTAINING ALCOHOL: 2-3 TIMES A WEEK

## 2024-12-17 ASSESSMENT — PAIN DESCRIPTION - DESCRIPTORS: DESCRIPTORS: ACHING

## 2024-12-17 ASSESSMENT — PAIN - FUNCTIONAL ASSESSMENT: PAIN_FUNCTIONAL_ASSESSMENT: NONE - DENIES PAIN

## 2024-12-17 ASSESSMENT — PAIN SCALES - GENERAL: PAINLEVEL_OUTOF10: 3

## 2024-12-17 ASSESSMENT — PAIN DESCRIPTION - LOCATION: LOCATION: THROAT

## 2024-12-17 NOTE — ED PROVIDER NOTES
[facility] EMERGENCY DEPARTMENT  EMERGENCY DEPARTMENT ENCOUNTER        Pt Name: Alfonso Ospina  MRN: [id number]  Birthdate 1985  Date of evaluation: 12/17/2024  Provider: Demetrius Mcdaniel MD  PCP: Carli Maki PA  Note Started: 12:11 PM EST 12/17/24    CHIEF COMPLAINT       Chief Complaint   Patient presents with    Seizures     Patient states he feels like he is going to have a seizure. Patient states he did not take his medication this morning because he was drinking.        HISTORY OF PRESENT ILLNESS: 1 or more Elements        Alfonso Ospina is a 39 y.o. male who presents for feeling like he is going to have a seizure as well as sore throat.  Patient reports that he feels like he is going to have a seizure.  He reports he did not take his antiepileptic medication last night as he was drinking.  He is requesting his medication.  He also reports that he has pain in his tongue and a sore throat.  He reports he has a white plaque on his tongue which is new.  He also reports when he tries to swallow it hurts.  He denies any history of candidiasis or thrush.  He says this has been ongoing for several days.  He denies fever or chills.  He denies neck pain or neck stiffness.  He denies tongue swelling.  He does have a history of HIV.  He is on antiretroviral therapy.    Nursing Notes were all reviewed and agreed with or any disagreements were addressed in the HPI.      REVIEW OF EXTERNAL NOTE :       7/9/24 internal medicine discharge summary      Chart Review/External Note Review    Last Echo reviewed by Me:  Lab Results   Component Value Date    LVEF 74 01/09/2020             Controlled Substance Monitoring:    Acute and Chronic Pain Monitoring:        No data to display                    REVIEW OF SYSTEMS :      Positives and Pertinent negatives as per HPI.     SURGICAL HISTORY     Past Surgical History:   Procedure Laterality Date    ABDOMEN SURGERY      COLONOSCOPY

## 2024-12-17 NOTE — DISCHARGE INSTR - COC
24 COVID-19 & Influenza Combo   24 Infection                        Nurse Assessment:  Last Vital Signs: BP (!) 145/93   Pulse 99   Temp 98.1 °F (36.7 °C) (Oral)   Resp 18   SpO2 97%     Last documented pain score (0-10 scale): Pain Level: 3  Last Weight:   Wt Readings from Last 1 Encounters:   24 53.5 kg (118 lb)     Mental Status:  {IP PT MENTAL STATUS:}    IV Access:  { DENICE IV ACCESS:030571120}    Nursing Mobility/ADLs:  Walking   {CHP DME ADLs:413718592}  Transfer  {CHP DME ADLs:192216343}  Bathing  {CHP DME ADLs:642990249}  Dressing  {CHP DME ADLs:462203571}  Toileting  {CHP DME ADLs:385865180}  Feeding  {CHP DME ADLs:681429188}  Med Admin  {P DME ADLs:428408340}  Med Delivery   { DENICE MED Delivery:069857791}    Wound Care Documentation and Therapy:        Elimination:  Continence:   Bowel: {YES / NO:}  Bladder: {YES / NO:}  Urinary Catheter: {Urinary Catheter:636183089}   Colostomy/Ileostomy/Ileal Conduit: {YES / NO:}       Date of Last BM: ***    Intake/Output Summary (Last 24 hours) at 2024 1429  Last data filed at 2024 1241  Gross per 24 hour   Intake 1000 ml   Output --   Net 1000 ml     No intake/output data recorded.    Safety Concerns:     { DENICE Safety Concerns:498420577}    Impairments/Disabilities:      { DENICE Impairments/Disabilities:618496042}    Nutrition Therapy:  Current Nutrition Therapy:   { DENICE Diet List:706896830}    Routes of Feeding: {P DME Other Feedings:748175194}  Liquids: {Slp liquid thickness:83224}  Daily Fluid Restriction: {CHP DME Yes amt example:207500569}  Last Modified Barium Swallow with Video (Video Swallowing Test): {Done Not Done Date:}    Treatments at the Time of Hospital Discharge:   Respiratory Treatments: ***  Oxygen Therapy:  {Therapy; copd oxygen:92951}  Ventilator:    { CC Vent List:053568585}    Rehab Therapies: {THERAPEUTIC INTERVENTION:5790438384}  Weight Bearing

## 2025-01-02 ENCOUNTER — HOSPITAL ENCOUNTER (EMERGENCY)
Age: 40
Discharge: HOME | End: 2025-01-03
Attending: EMERGENCY MEDICINE
Payer: MEDICAID

## 2025-01-02 DIAGNOSIS — Z91.199 NONCOMPLIANCE: ICD-10-CM

## 2025-01-02 DIAGNOSIS — G40.919 BREAKTHROUGH SEIZURE (HCC): Primary | ICD-10-CM

## 2025-01-02 DIAGNOSIS — F19.10 POLYSUBSTANCE ABUSE (HCC): ICD-10-CM

## 2025-01-02 PROCEDURE — 99284 EMERGENCY DEPT VISIT MOD MDM: CPT

## 2025-01-02 PROCEDURE — 96374 THER/PROPH/DIAG INJ IV PUSH: CPT

## 2025-01-03 ENCOUNTER — APPOINTMENT (OUTPATIENT)
Dept: CT IMAGING | Age: 40
End: 2025-01-03
Payer: MEDICAID

## 2025-01-03 VITALS
OXYGEN SATURATION: 96 % | DIASTOLIC BLOOD PRESSURE: 100 MMHG | TEMPERATURE: 97.4 F | BODY MASS INDEX: 20.49 KG/M2 | WEIGHT: 120 LBS | SYSTOLIC BLOOD PRESSURE: 131 MMHG | HEART RATE: 61 BPM | RESPIRATION RATE: 13 BRPM | HEIGHT: 64 IN

## 2025-01-03 LAB
ALBUMIN SERPL-MCNC: 4.2 G/DL (ref 3.5–5.2)
ALP SERPL-CCNC: 71 U/L (ref 40–129)
ALT SERPL-CCNC: 55 U/L (ref 0–40)
AMPHET UR QL SCN: NEGATIVE
ANION GAP SERPL CALCULATED.3IONS-SCNC: 13 MMOL/L (ref 7–16)
APAP SERPL-MCNC: <5 UG/ML (ref 10–30)
AST SERPL-CCNC: 66 U/L (ref 0–39)
BARBITURATES UR QL SCN: POSITIVE
BASOPHILS # BLD: 0.07 K/UL (ref 0–0.2)
BASOPHILS NFR BLD: 2 % (ref 0–2)
BENZODIAZ UR QL: NEGATIVE
BILIRUB SERPL-MCNC: <0.2 MG/DL (ref 0–1.2)
BUN SERPL-MCNC: 15 MG/DL (ref 6–20)
BUPRENORPHINE UR QL: NEGATIVE
CALCIUM SERPL-MCNC: 9.1 MG/DL (ref 8.6–10.2)
CANNABINOIDS UR QL SCN: POSITIVE
CHLORIDE SERPL-SCNC: 102 MMOL/L (ref 98–107)
CHP ED QC CHECK: YES
CO2 SERPL-SCNC: 26 MMOL/L (ref 22–29)
COCAINE UR QL SCN: NEGATIVE
CREAT SERPL-MCNC: 1 MG/DL (ref 0.7–1.2)
DATE LAST DOSE: ABNORMAL
EKG ATRIAL RATE: 77 BPM
EKG P AXIS: 38 DEGREES
EKG P-R INTERVAL: 126 MS
EKG Q-T INTERVAL: 426 MS
EKG QRS DURATION: 102 MS
EKG QTC CALCULATION (BAZETT): 482 MS
EKG R AXIS: 45 DEGREES
EKG T AXIS: 21 DEGREES
EKG VENTRICULAR RATE: 77 BPM
EOSINOPHIL # BLD: 0.03 K/UL (ref 0.05–0.5)
EOSINOPHILS RELATIVE PERCENT: 1 % (ref 0–6)
ERYTHROCYTE [DISTWIDTH] IN BLOOD BY AUTOMATED COUNT: 12.2 % (ref 11.5–15)
ETHANOLAMINE SERPL-MCNC: 38 MG/DL (ref 0–0.08)
FENTANYL UR QL: POSITIVE
GFR, ESTIMATED: >90 ML/MIN/1.73M2
GLUCOSE BLD-MCNC: 104 MG/DL
GLUCOSE BLD-MCNC: 104 MG/DL (ref 74–99)
GLUCOSE SERPL-MCNC: 94 MG/DL (ref 74–99)
HCT VFR BLD AUTO: 39.8 % (ref 37–54)
HGB BLD-MCNC: 13.6 G/DL (ref 12.5–16.5)
IMM GRANULOCYTES # BLD AUTO: 0.03 K/UL (ref 0–0.58)
IMM GRANULOCYTES NFR BLD: 1 % (ref 0–5)
LACTATE BLDV-SCNC: 1.4 MMOL/L (ref 0.5–2.2)
LACTATE BLDV-SCNC: 2.5 MMOL/L (ref 0.5–2.2)
LACTATE BLDV-SCNC: 2.8 MMOL/L (ref 0.5–2.2)
LYMPHOCYTES NFR BLD: 2 K/UL (ref 1.5–4)
LYMPHOCYTES RELATIVE PERCENT: 51 % (ref 20–42)
MCH RBC QN AUTO: 34 PG (ref 26–35)
MCHC RBC AUTO-ENTMCNC: 34.2 G/DL (ref 32–34.5)
MCV RBC AUTO: 99.5 FL (ref 80–99.9)
METHADONE UR QL: NEGATIVE
MONOCYTES NFR BLD: 0.42 K/UL (ref 0.1–0.95)
MONOCYTES NFR BLD: 11 % (ref 2–12)
NEUTROPHILS NFR BLD: 35 % (ref 43–80)
NEUTS SEG NFR BLD: 1.37 K/UL (ref 1.8–7.3)
OPIATES UR QL SCN: NEGATIVE
OXYCODONE UR QL SCN: NEGATIVE
PCP UR QL SCN: NEGATIVE
PLATELET # BLD AUTO: 328 K/UL (ref 130–450)
PMV BLD AUTO: 9.1 FL (ref 7–12)
POTASSIUM SERPL-SCNC: 3.5 MMOL/L (ref 3.5–5)
PROT SERPL-MCNC: 6.6 G/DL (ref 6.4–8.3)
RBC # BLD AUTO: 4 M/UL (ref 3.8–5.8)
SALICYLATES SERPL-MCNC: <0.3 MG/DL (ref 0–30)
SODIUM SERPL-SCNC: 141 MMOL/L (ref 132–146)
TEST INFORMATION: ABNORMAL
TME LAST DOSE: ABNORMAL H
TOXIC TRICYCLIC SC,BLOOD: NEGATIVE
TROPONIN I SERPL HS-MCNC: 11 NG/L (ref 0–11)
TROPONIN I SERPL HS-MCNC: 12 NG/L (ref 0–11)
VALPROATE SERPL-MCNC: 26 UG/ML (ref 50–100)
VANCOMYCIN DOSE: ABNORMAL MG
WBC OTHER # BLD: 3.9 K/UL (ref 4.5–11.5)

## 2025-01-03 PROCEDURE — 2580000003 HC RX 258: Performed by: EMERGENCY MEDICINE

## 2025-01-03 PROCEDURE — 84484 ASSAY OF TROPONIN QUANT: CPT

## 2025-01-03 PROCEDURE — 80164 ASSAY DIPROPYLACETIC ACD TOT: CPT

## 2025-01-03 PROCEDURE — 85025 COMPLETE CBC W/AUTO DIFF WBC: CPT

## 2025-01-03 PROCEDURE — 80143 DRUG ASSAY ACETAMINOPHEN: CPT

## 2025-01-03 PROCEDURE — 70450 CT HEAD/BRAIN W/O DYE: CPT

## 2025-01-03 PROCEDURE — 80179 DRUG ASSAY SALICYLATE: CPT

## 2025-01-03 PROCEDURE — 80307 DRUG TEST PRSMV CHEM ANLYZR: CPT

## 2025-01-03 PROCEDURE — 6360000002 HC RX W HCPCS: Performed by: EMERGENCY MEDICINE

## 2025-01-03 PROCEDURE — 83605 ASSAY OF LACTIC ACID: CPT

## 2025-01-03 PROCEDURE — 80053 COMPREHEN METABOLIC PANEL: CPT

## 2025-01-03 PROCEDURE — 82962 GLUCOSE BLOOD TEST: CPT

## 2025-01-03 PROCEDURE — 70486 CT MAXILLOFACIAL W/O DYE: CPT

## 2025-01-03 PROCEDURE — 80177 DRUG SCRN QUAN LEVETIRACETAM: CPT

## 2025-01-03 PROCEDURE — 72125 CT NECK SPINE W/O DYE: CPT

## 2025-01-03 PROCEDURE — 93005 ELECTROCARDIOGRAM TRACING: CPT | Performed by: EMERGENCY MEDICINE

## 2025-01-03 PROCEDURE — G0480 DRUG TEST DEF 1-7 CLASSES: HCPCS

## 2025-01-03 RX ORDER — LEVETIRACETAM 500 MG/5ML
1000 INJECTION, SOLUTION, CONCENTRATE INTRAVENOUS ONCE
Status: COMPLETED | OUTPATIENT
Start: 2025-01-03 | End: 2025-01-03

## 2025-01-03 RX ORDER — CETIRIZINE HYDROCHLORIDE 10 MG/1
TABLET ORAL
COMMUNITY
Start: 2025-01-02

## 2025-01-03 RX ORDER — 0.9 % SODIUM CHLORIDE 0.9 %
1000 INTRAVENOUS SOLUTION INTRAVENOUS ONCE
Status: COMPLETED | OUTPATIENT
Start: 2025-01-03 | End: 2025-01-03

## 2025-01-03 RX ORDER — RESVER/WINE/BFL/GRPSD/PC/C/POM 200MG-60MG
CAPSULE ORAL
COMMUNITY
Start: 2024-11-06

## 2025-01-03 RX ORDER — LORAZEPAM 0.5 MG
1000 TABLET ORAL
COMMUNITY

## 2025-01-03 RX ORDER — CLONIDINE HYDROCHLORIDE 0.1 MG/1
TABLET ORAL
COMMUNITY
Start: 2025-01-02

## 2025-01-03 RX ADMIN — LEVETIRACETAM 1000 MG: 100 INJECTION INTRAVENOUS at 01:07

## 2025-01-03 RX ADMIN — SODIUM CHLORIDE 1000 ML: 9 INJECTION, SOLUTION INTRAVENOUS at 01:16

## 2025-01-03 RX ADMIN — SODIUM CHLORIDE 1000 ML: 9 INJECTION, SOLUTION INTRAVENOUS at 03:42

## 2025-01-03 ASSESSMENT — ENCOUNTER SYMPTOMS
SORE THROAT: 0
BACK PAIN: 0
EYE DISCHARGE: 0
EYE REDNESS: 0
SHORTNESS OF BREATH: 0
WHEEZING: 0
ABDOMINAL PAIN: 0
COUGH: 0
DIARRHEA: 0
SINUS PRESSURE: 0
VOMITING: 0
EYE PAIN: 0
NAUSEA: 0

## 2025-01-03 NOTE — ED PROVIDER NOTES
Patient is a 40 y/o male who presents to the ED via EMS after a seizure. Patient states that he stood up tonight and he saw \"halos\" around everything. He then had a witnessed seizure. He did hit his head when he fell to the ground. Currently, he complains of a headache and facial pain. He denies any chest pain, palpitations or shortness of breath. He does report a history of seizures. His last seizure was last month. He takes Depakote and Keppra and denies missing any doses. He received 10 mg rectal Valium en route.         Review of Systems   Constitutional:  Negative for chills and fever.   HENT:  Negative for ear pain, sinus pressure and sore throat.    Eyes:  Negative for pain, discharge and redness.   Respiratory:  Negative for cough, shortness of breath and wheezing.    Cardiovascular:  Negative for chest pain.   Gastrointestinal:  Negative for abdominal pain, diarrhea, nausea and vomiting.   Genitourinary:  Negative for dysuria and frequency.   Musculoskeletal:  Negative for arthralgias and back pain.   Skin:  Negative for rash and wound.   Neurological:  Positive for seizures and headaches. Negative for weakness.   Hematological:  Negative for adenopathy.   All other systems reviewed and are negative.       Physical Exam  Vitals and nursing note reviewed.   Constitutional:       General: He is not in acute distress.  HENT:      Head: Normocephalic.      Comments: Abrasion left periorbital face     Right Ear: External ear normal.      Left Ear: External ear normal.      Nose: Nose normal.      Mouth/Throat:      Mouth: Mucous membranes are moist.   Eyes:      Conjunctiva/sclera: Conjunctivae normal.      Pupils: Pupils are equal, round, and reactive to light.   Neck:      Comments: No midline cervical spine tenderness.  Cardiovascular:      Rate and Rhythm: Normal rate and regular rhythm.      Heart sounds: No murmur heard.  Pulmonary:      Effort: Pulmonary effort is normal. No respiratory distress.       (non-tartrazine)    -------------------------------------------------- RESULTS -------------------------------------------------  Labs:  Results for orders placed or performed during the hospital encounter of 01/02/25   CBC with Auto Differential   Result Value Ref Range    WBC 3.9 (L) 4.5 - 11.5 k/uL    RBC 4.00 3.80 - 5.80 m/uL    Hemoglobin 13.6 12.5 - 16.5 g/dL    Hematocrit 39.8 37.0 - 54.0 %    MCV 99.5 80.0 - 99.9 fL    MCH 34.0 26.0 - 35.0 pg    MCHC 34.2 32.0 - 34.5 g/dL    RDW 12.2 11.5 - 15.0 %    Platelets 328 130 - 450 k/uL    MPV 9.1 7.0 - 12.0 fL    Neutrophils % 35 (L) 43.0 - 80.0 %    Lymphocytes % 51 (H) 20.0 - 42.0 %    Monocytes % 11 2.0 - 12.0 %    Eosinophils % 1 0 - 6 %    Basophils % 2 0.0 - 2.0 %    Immature Granulocytes % 1 0.0 - 5.0 %    Neutrophils Absolute 1.37 (L) 1.80 - 7.30 k/uL    Lymphocytes Absolute 2.00 1.50 - 4.00 k/uL    Monocytes Absolute 0.42 0.10 - 0.95 k/uL    Eosinophils Absolute 0.03 (L) 0.05 - 0.50 k/uL    Basophils Absolute 0.07 0.00 - 0.20 k/uL    Immature Granulocytes Absolute 0.03 0.00 - 0.58 k/uL   Comprehensive Metabolic Panel   Result Value Ref Range    Sodium 141 132 - 146 mmol/L    Potassium 3.5 3.5 - 5.0 mmol/L    Chloride 102 98 - 107 mmol/L    CO2 26 22 - 29 mmol/L    Anion Gap 13 7 - 16 mmol/L    Glucose 94 74 - 99 mg/dL    BUN 15 6 - 20 mg/dL    Creatinine 1.0 0.70 - 1.20 mg/dL    Est, Glom Filt Rate >90 >60 mL/min/1.73m2    Calcium 9.1 8.6 - 10.2 mg/dL    Total Protein 6.6 6.4 - 8.3 g/dL    Albumin 4.2 3.5 - 5.2 g/dL    Total Bilirubin <0.2 0.0 - 1.2 mg/dL    Alkaline Phosphatase 71 40 - 129 U/L    ALT 55 (H) 0 - 40 U/L    AST 66 (H) 0 - 39 U/L   Troponin   Result Value Ref Range    Troponin, High Sensitivity 11 0 - 11 ng/L   Lactic Acid   Result Value Ref Range    Lactic Acid 2.8 (H) 0.5 - 2.2 mmol/L   SERUM DRUG SCREEN   Result Value Ref Range    Acetaminophen Level <5 (L) 10.0 - 30.0 ug/mL    Ethanol Lvl 38 (H) <10 mg/dL    Salicylate Lvl <0.3 0.0 - 30.0

## 2025-01-03 NOTE — ED NOTES
Called report to [facility]. Was asked by [facility] to please fax them copies of the testing and results we had for this patient. I told the Charge person at [facility] that I cannot release medical information for this patient and that they would need to contact medical records. The charge person I spoke with states that we fax them information on patients all the time. Spoke with Ray ADORNO Charge nurse and he agreed we should not release any PHI.

## 2025-01-03 NOTE — ED NOTES
If/when patient is discharged call [facility] charge at [phone] and they asked to make sure patient is sent back with discharge paperwork

## 2025-01-05 LAB — LEVETIRACETAM SERPL-MCNC: 4 UG/ML

## 2025-01-06 LAB
EKG ATRIAL RATE: 77 BPM
EKG P AXIS: 38 DEGREES
EKG P-R INTERVAL: 126 MS
EKG Q-T INTERVAL: 426 MS
EKG QRS DURATION: 102 MS
EKG QTC CALCULATION (BAZETT): 482 MS
EKG R AXIS: 45 DEGREES
EKG T AXIS: 21 DEGREES
EKG VENTRICULAR RATE: 77 BPM

## 2025-01-06 PROCEDURE — 93010 ELECTROCARDIOGRAM REPORT: CPT | Performed by: INTERNAL MEDICINE

## 2025-02-17 DIAGNOSIS — R56.9 SEIZURE (HCC): ICD-10-CM

## 2025-03-03 ENCOUNTER — HOSPITAL ENCOUNTER (OUTPATIENT)
Dept: MRI IMAGING | Age: 40
Discharge: HOME | End: 2025-03-05
Payer: MEDICAID

## 2025-03-03 DIAGNOSIS — R56.9 SEIZURE (HCC): ICD-10-CM

## 2025-03-03 PROCEDURE — A9579 GAD-BASE MR CONTRAST NOS,1ML: HCPCS | Performed by: RADIOLOGY

## 2025-03-03 PROCEDURE — 6360000004 HC RX CONTRAST MEDICATION: Performed by: RADIOLOGY

## 2025-03-03 PROCEDURE — 70553 MRI BRAIN STEM W/O & W/DYE: CPT

## 2025-03-03 RX ADMIN — GADOTERIDOL 12 ML: 279.3 INJECTION, SOLUTION INTRAVENOUS at 10:50

## 2025-04-08 ENCOUNTER — HOSPITAL ENCOUNTER (EMERGENCY)
Age: 40
Discharge: HOME | End: 2025-04-09
Attending: EMERGENCY MEDICINE
Payer: MEDICAID

## 2025-04-08 ENCOUNTER — APPOINTMENT (OUTPATIENT)
Dept: CT IMAGING | Age: 40
End: 2025-04-08
Attending: EMERGENCY MEDICINE
Payer: MEDICAID

## 2025-04-08 DIAGNOSIS — G40.919 BREAKTHROUGH SEIZURE (HCC): ICD-10-CM

## 2025-04-08 DIAGNOSIS — F10.929 ACUTE ALCOHOLIC INTOXICATION WITH COMPLICATION: ICD-10-CM

## 2025-04-08 DIAGNOSIS — R00.0 TACHYCARDIA: ICD-10-CM

## 2025-04-08 DIAGNOSIS — G40.909 SEIZURE DISORDER (HCC): Primary | ICD-10-CM

## 2025-04-08 LAB
ALBUMIN SERPL-MCNC: 4.7 G/DL (ref 3.5–5.2)
ALP SERPL-CCNC: 75 U/L (ref 40–129)
ALT SERPL-CCNC: 27 U/L (ref 0–40)
ANION GAP SERPL CALCULATED.3IONS-SCNC: 21 MMOL/L (ref 7–16)
APAP SERPL-MCNC: <5 UG/ML (ref 10–30)
AST SERPL-CCNC: 75 U/L (ref 0–39)
BASOPHILS # BLD: 0.05 K/UL (ref 0–0.2)
BASOPHILS NFR BLD: 1 % (ref 0–2)
BILIRUB SERPL-MCNC: 0.2 MG/DL (ref 0–1.2)
BUN SERPL-MCNC: 14 MG/DL (ref 6–20)
CALCIUM SERPL-MCNC: 8.3 MG/DL (ref 8.6–10.2)
CHLORIDE SERPL-SCNC: 105 MMOL/L (ref 98–107)
CO2 SERPL-SCNC: 19 MMOL/L (ref 22–29)
CREAT SERPL-MCNC: 0.9 MG/DL (ref 0.7–1.2)
EOSINOPHIL # BLD: 0 K/UL (ref 0.05–0.5)
EOSINOPHILS RELATIVE PERCENT: 0 % (ref 0–6)
ERYTHROCYTE [DISTWIDTH] IN BLOOD BY AUTOMATED COUNT: 13.5 % (ref 11.5–15)
ETHANOLAMINE SERPL-MCNC: 431 MG/DL (ref 0–0.08)
GFR, ESTIMATED: >90 ML/MIN/1.73M2
GLUCOSE BLD-MCNC: 89 MG/DL (ref 74–99)
GLUCOSE SERPL-MCNC: 74 MG/DL (ref 74–99)
HCT VFR BLD AUTO: 38.3 % (ref 37–54)
HGB BLD-MCNC: 12.8 G/DL (ref 12.5–16.5)
IMM GRANULOCYTES # BLD AUTO: <0.03 K/UL (ref 0–0.58)
IMM GRANULOCYTES NFR BLD: 0 % (ref 0–5)
LYMPHOCYTES NFR BLD: 2.55 K/UL (ref 1.5–4)
LYMPHOCYTES RELATIVE PERCENT: 30 % (ref 20–42)
MCH RBC QN AUTO: 31.9 PG (ref 26–35)
MCHC RBC AUTO-ENTMCNC: 33.4 G/DL (ref 32–34.5)
MCV RBC AUTO: 95.5 FL (ref 80–99.9)
MONOCYTES NFR BLD: 0.41 K/UL (ref 0.1–0.95)
MONOCYTES NFR BLD: 5 % (ref 2–12)
NEUTROPHILS NFR BLD: 64 % (ref 43–80)
NEUTS SEG NFR BLD: 5.38 K/UL (ref 1.8–7.3)
PLATELET # BLD AUTO: 211 K/UL (ref 130–450)
PMV BLD AUTO: 8.7 FL (ref 7–12)
POTASSIUM SERPL-SCNC: 3.4 MMOL/L (ref 3.5–5)
PROT SERPL-MCNC: 6.6 G/DL (ref 6.4–8.3)
RBC # BLD AUTO: 4.01 M/UL (ref 3.8–5.8)
SALICYLATES SERPL-MCNC: <0.3 MG/DL (ref 0–30)
SODIUM SERPL-SCNC: 145 MMOL/L (ref 132–146)
TOXIC TRICYCLIC SC,BLOOD: NEGATIVE
WBC OTHER # BLD: 8.4 K/UL (ref 4.5–11.5)

## 2025-04-08 PROCEDURE — 6360000002 HC RX W HCPCS

## 2025-04-08 PROCEDURE — 70450 CT HEAD/BRAIN W/O DYE: CPT

## 2025-04-08 PROCEDURE — 82962 GLUCOSE BLOOD TEST: CPT

## 2025-04-08 PROCEDURE — 80143 DRUG ASSAY ACETAMINOPHEN: CPT

## 2025-04-08 PROCEDURE — 96375 TX/PRO/DX INJ NEW DRUG ADDON: CPT

## 2025-04-08 PROCEDURE — 85025 COMPLETE CBC W/AUTO DIFF WBC: CPT

## 2025-04-08 PROCEDURE — 6360000002 HC RX W HCPCS: Performed by: EMERGENCY MEDICINE

## 2025-04-08 PROCEDURE — 96374 THER/PROPH/DIAG INJ IV PUSH: CPT

## 2025-04-08 PROCEDURE — 80179 DRUG ASSAY SALICYLATE: CPT

## 2025-04-08 PROCEDURE — 93005 ELECTROCARDIOGRAM TRACING: CPT | Performed by: EMERGENCY MEDICINE

## 2025-04-08 PROCEDURE — G0480 DRUG TEST DEF 1-7 CLASSES: HCPCS

## 2025-04-08 PROCEDURE — 80307 DRUG TEST PRSMV CHEM ANLYZR: CPT

## 2025-04-08 PROCEDURE — 99284 EMERGENCY DEPT VISIT MOD MDM: CPT

## 2025-04-08 PROCEDURE — 80053 COMPREHEN METABOLIC PANEL: CPT

## 2025-04-08 RX ORDER — LORAZEPAM 2 MG/ML
INJECTION INTRAMUSCULAR
Status: COMPLETED
Start: 2025-04-08 | End: 2025-04-08

## 2025-04-08 RX ORDER — LEVETIRACETAM 500 MG/5ML
1500 INJECTION, SOLUTION, CONCENTRATE INTRAVENOUS ONCE
Status: COMPLETED | OUTPATIENT
Start: 2025-04-08 | End: 2025-04-08

## 2025-04-08 RX ORDER — LORAZEPAM 2 MG/ML
2 INJECTION INTRAMUSCULAR ONCE
Status: COMPLETED | OUTPATIENT
Start: 2025-04-08 | End: 2025-04-08

## 2025-04-08 RX ADMIN — LORAZEPAM 2 MG: 2 INJECTION INTRAMUSCULAR at 22:28

## 2025-04-08 RX ADMIN — LEVETIRACETAM 1500 MG: 100 INJECTION INTRAVENOUS at 22:20

## 2025-04-08 RX ADMIN — LORAZEPAM 2 MG: 2 INJECTION INTRAMUSCULAR; INTRAVENOUS at 22:28

## 2025-04-08 ASSESSMENT — PAIN - FUNCTIONAL ASSESSMENT: PAIN_FUNCTIONAL_ASSESSMENT: NONE - DENIES PAIN

## 2025-04-09 VITALS
RESPIRATION RATE: 16 BRPM | HEART RATE: 96 BPM | OXYGEN SATURATION: 96 % | WEIGHT: 120 LBS | HEIGHT: 64 IN | BODY MASS INDEX: 20.49 KG/M2 | TEMPERATURE: 98.1 F | DIASTOLIC BLOOD PRESSURE: 91 MMHG | SYSTOLIC BLOOD PRESSURE: 109 MMHG

## 2025-04-09 LAB
DATE LAST DOSE: ABNORMAL
EKG ATRIAL RATE: 90 BPM
EKG P AXIS: 42 DEGREES
EKG P-R INTERVAL: 118 MS
EKG Q-T INTERVAL: 392 MS
EKG QRS DURATION: 112 MS
EKG QTC CALCULATION (BAZETT): 479 MS
EKG R AXIS: 55 DEGREES
EKG T AXIS: 28 DEGREES
EKG VENTRICULAR RATE: 90 BPM
ETHANOLAMINE SERPL-MCNC: 121 MG/DL (ref 0–0.08)
ETHANOLAMINE SERPL-MCNC: 190 MG/DL (ref 0–0.08)
ETHANOLAMINE SERPL-MCNC: 251 MG/DL (ref 0–0.08)
GLUCOSE BLD-MCNC: 138 MG/DL (ref 74–99)
GLUCOSE BLD-MCNC: 70 MG/DL (ref 74–99)
TME LAST DOSE: ABNORMAL H
VALPROATE SERPL-MCNC: 42 UG/ML (ref 50–100)
VANCOMYCIN DOSE: ABNORMAL MG

## 2025-04-09 PROCEDURE — 2500000003 HC RX 250 WO HCPCS: Performed by: EMERGENCY MEDICINE

## 2025-04-09 PROCEDURE — 93010 ELECTROCARDIOGRAM REPORT: CPT | Performed by: INTERNAL MEDICINE

## 2025-04-09 PROCEDURE — G0480 DRUG TEST DEF 1-7 CLASSES: HCPCS

## 2025-04-09 PROCEDURE — 80164 ASSAY DIPROPYLACETIC ACD TOT: CPT

## 2025-04-09 PROCEDURE — 96361 HYDRATE IV INFUSION ADD-ON: CPT

## 2025-04-09 PROCEDURE — 2580000003 HC RX 258: Performed by: EMERGENCY MEDICINE

## 2025-04-09 PROCEDURE — 6370000000 HC RX 637 (ALT 250 FOR IP): Performed by: EMERGENCY MEDICINE

## 2025-04-09 PROCEDURE — 82962 GLUCOSE BLOOD TEST: CPT

## 2025-04-09 PROCEDURE — 6360000002 HC RX W HCPCS: Performed by: EMERGENCY MEDICINE

## 2025-04-09 PROCEDURE — 96372 THER/PROPH/DIAG INJ SC/IM: CPT

## 2025-04-09 RX ORDER — SODIUM CHLORIDE 0.9 % (FLUSH) 0.9 %
5-40 SYRINGE (ML) INJECTION EVERY 12 HOURS SCHEDULED
Status: DISCONTINUED | OUTPATIENT
Start: 2025-04-09 | End: 2025-04-09 | Stop reason: HOSPADM

## 2025-04-09 RX ORDER — LEVETIRACETAM 500 MG/1
1000 TABLET ORAL 2 TIMES DAILY
Status: DISCONTINUED | OUTPATIENT
Start: 2025-04-09 | End: 2025-04-09 | Stop reason: HOSPADM

## 2025-04-09 RX ORDER — LORAZEPAM 2 MG/ML
4 INJECTION INTRAMUSCULAR
Status: DISCONTINUED | OUTPATIENT
Start: 2025-04-09 | End: 2025-04-09 | Stop reason: HOSPADM

## 2025-04-09 RX ORDER — LORAZEPAM 1 MG/1
4 TABLET ORAL
Status: DISCONTINUED | OUTPATIENT
Start: 2025-04-09 | End: 2025-04-09 | Stop reason: HOSPADM

## 2025-04-09 RX ORDER — LORAZEPAM 2 MG/ML
1 INJECTION INTRAMUSCULAR
Status: DISCONTINUED | OUTPATIENT
Start: 2025-04-09 | End: 2025-04-09 | Stop reason: HOSPADM

## 2025-04-09 RX ORDER — LORAZEPAM 2 MG/ML
3 INJECTION INTRAMUSCULAR
Status: DISCONTINUED | OUTPATIENT
Start: 2025-04-09 | End: 2025-04-09 | Stop reason: HOSPADM

## 2025-04-09 RX ORDER — GLUCAGON 1 MG/ML
1 KIT INJECTION PRN
Status: DISCONTINUED | OUTPATIENT
Start: 2025-04-09 | End: 2025-04-09 | Stop reason: HOSPADM

## 2025-04-09 RX ORDER — PHENOBARBITAL SODIUM 65 MG/ML
130 INJECTION, SOLUTION INTRAMUSCULAR; INTRAVENOUS ONCE
Status: COMPLETED | OUTPATIENT
Start: 2025-04-09 | End: 2025-04-09

## 2025-04-09 RX ORDER — 0.9 % SODIUM CHLORIDE 0.9 %
1000 INTRAVENOUS SOLUTION INTRAVENOUS ONCE
Status: COMPLETED | OUTPATIENT
Start: 2025-04-09 | End: 2025-04-09

## 2025-04-09 RX ORDER — LORAZEPAM 2 MG/ML
2 INJECTION INTRAMUSCULAR
Status: DISCONTINUED | OUTPATIENT
Start: 2025-04-09 | End: 2025-04-09 | Stop reason: HOSPADM

## 2025-04-09 RX ORDER — LANOLIN ALCOHOL/MO/W.PET/CERES
100 CREAM (GRAM) TOPICAL DAILY
Status: DISCONTINUED | OUTPATIENT
Start: 2025-04-09 | End: 2025-04-09 | Stop reason: HOSPADM

## 2025-04-09 RX ORDER — DEXTROSE MONOHYDRATE 25 G/50ML
25 INJECTION, SOLUTION INTRAVENOUS ONCE
Status: COMPLETED | OUTPATIENT
Start: 2025-04-09 | End: 2025-04-09

## 2025-04-09 RX ORDER — LORAZEPAM 1 MG/1
1 TABLET ORAL
Status: DISCONTINUED | OUTPATIENT
Start: 2025-04-09 | End: 2025-04-09 | Stop reason: HOSPADM

## 2025-04-09 RX ORDER — SODIUM CHLORIDE 0.9 % (FLUSH) 0.9 %
5-40 SYRINGE (ML) INJECTION PRN
Status: DISCONTINUED | OUTPATIENT
Start: 2025-04-09 | End: 2025-04-09 | Stop reason: HOSPADM

## 2025-04-09 RX ORDER — LORAZEPAM 1 MG/1
3 TABLET ORAL
Status: DISCONTINUED | OUTPATIENT
Start: 2025-04-09 | End: 2025-04-09 | Stop reason: HOSPADM

## 2025-04-09 RX ORDER — SODIUM CHLORIDE 9 MG/ML
INJECTION, SOLUTION INTRAVENOUS PRN
Status: DISCONTINUED | OUTPATIENT
Start: 2025-04-09 | End: 2025-04-09 | Stop reason: HOSPADM

## 2025-04-09 RX ORDER — LORAZEPAM 1 MG/1
2 TABLET ORAL
Status: DISCONTINUED | OUTPATIENT
Start: 2025-04-09 | End: 2025-04-09 | Stop reason: HOSPADM

## 2025-04-09 RX ORDER — DEXTROSE MONOHYDRATE 100 MG/ML
INJECTION, SOLUTION INTRAVENOUS CONTINUOUS PRN
Status: DISCONTINUED | OUTPATIENT
Start: 2025-04-09 | End: 2025-04-09 | Stop reason: HOSPADM

## 2025-04-09 RX ADMIN — PHENOBARBITAL SODIUM 130 MG: 65 INJECTION INTRAMUSCULAR; INTRAVENOUS at 11:38

## 2025-04-09 RX ADMIN — POTASSIUM BICARBONATE 40 MEQ: 782 TABLET, EFFERVESCENT ORAL at 06:37

## 2025-04-09 RX ADMIN — SODIUM CHLORIDE, PRESERVATIVE FREE 10 ML: 5 INJECTION INTRAVENOUS at 11:39

## 2025-04-09 RX ADMIN — LEVETIRACETAM 1000 MG: 500 TABLET, FILM COATED ORAL at 09:31

## 2025-04-09 RX ADMIN — Medication 100 MG: at 11:38

## 2025-04-09 RX ADMIN — SODIUM CHLORIDE 1000 ML: 9 INJECTION, SOLUTION INTRAVENOUS at 03:39

## 2025-04-09 RX ADMIN — DEXTROSE MONOHYDRATE 25 G: 25 INJECTION, SOLUTION INTRAVENOUS at 01:41

## 2025-04-09 NOTE — CARE COORDINATION
Peer Recovery Support Note    Name: Alfonso Ospina  Date: 4/9/2025    Chief Complaint   Patient presents with    Seizures     Patient drank a whole bottle of alcohol.  Patient started seizing after alcohol consumption.  Patient has hx of seizures.  Given 100 mg of Thiamine by EMT.       Peer Support met with patient.  [] Support and education provided  [] Resources provided   [] Treatment referral:   [x] Other:   [] Patient declined peer recovery services     Referred By: Jessica (RANDOLPH)    Notes: Peer offered services to patient who stated that he is with [facility] at this time doing IOP. He also stated that he has completed inpatient and was not interested right now.     Signed: Anneliese Soto, 4/9/2025

## 2025-04-09 NOTE — ED PROVIDER NOTES
Department of Emergency Medicine    ED  Provider Note - Sign out / Oncoming Provider   Admit Date/RoomTime: 4/8/2025 10:01 PM  0600EDT      I received this patient at sign out from Dr. Oquendo.  I have discussed the patient's initial exam, treatment and plan of care with the out going physician.  I have introduced my self to the patient / family and have answered their questions to this point.  I have examined the patient myself and reviewed ordered tests / medications and  reviewed any available results to this point.   If a resident is involved in the Emergency Department care, I have discussed my findings and plan with them as well.    Labs Reviewed   CBC WITH AUTO DIFFERENTIAL - Abnormal; Notable for the following components:       Result Value    Eosinophils Absolute 0.00 (*)     All other components within normal limits   COMPREHENSIVE METABOLIC PANEL - Abnormal; Notable for the following components:    Potassium 3.4 (*)     CO2 19 (*)     Anion Gap 21 (*)     Calcium 8.3 (*)     AST 75 (*)     All other components within normal limits   SERUM DRUG SCREEN - Abnormal; Notable for the following components:    Acetaminophen Level <5 (*)     Ethanol Lvl 431 (*)     All other components within normal limits   ETHANOL - Abnormal; Notable for the following components:    Ethanol Lvl 251 (*)     All other components within normal limits   ETHANOL - Abnormal; Notable for the following components:    Ethanol Lvl 190 (*)     All other components within normal limits   VALPROIC ACID LEVEL, TOTAL - Abnormal; Notable for the following components:    Valproic Acid Lvl 42 (*)     All other components within normal limits   POCT GLUCOSE - Abnormal; Notable for the following components:    POC Glucose 70 (*)     All other components within normal limits   POCT GLUCOSE - Abnormal; Notable for the following components:    POC Glucose 138 (*)     All other components within normal limits   URINE DRUG SCREEN   ETHANOL   ETHANOL

## 2025-04-09 NOTE — DISCHARGE INSTRUCTIONS
accepted with Private Insurance, Medicare & Medicaid   *Payments Accepted: Medicare, Medicaid, Private Insurance and Self-Pay  *Services Offered: Behavior Health Services for Counseling.      [facility] [phone] [address] [place] [address]  [email or web address]  *Payments Accepted: Sliding Scale for Fees.  *Services Offered: Call for Available Services.       [employer] [phone] [address] [place] [address]  [email or web address]  (MUST CALL DAILY FOR BED AVAILABILITY)  *Residents of:  All [facility].   *Payments Accepted: Medicaid and Self-Pay.  *Services Offered: Detox & Substance Abuse Outpatient Programs.

## 2025-04-09 NOTE — ED PROVIDER NOTES
Department of Emergency Medicine   ED  Provider Note  Admit Date/RoomTime: 4/8/2025 10:01 PM  ED Room: 16/16          History of Present Illness:  4/9/25, Time: 3:18 AM EDT  Chief Complaint   Patient presents with    Seizures     Patient drank a whole bottle of alcohol.  Patient started seizing after alcohol consumption.  Patient has hx of seizures.  Given 100 mg of Thiamine by EMT.                Alfonso Ospina is a 39 y.o. male presenting to the ED for possible seizure-like activity.  Patient does have a history epilepsy.  He was at home.  Apparently had seizures witnessed by .  He was given thiamine by EMS.  No seizure witnessed by EMS.  He says he has been drinking all day.  He states he does drink daily.  No fevers or chills.  No nausea vomiting.  No neck pain or stiffness.  No paresthesias.  No cough or sputum.  No other symptoms or complaints.    Review of Systems:   Pertinent positives and negatives are stated within HPI, all other systems reviewed and are negative.        --------------------------------------------- PAST HISTORY ---------------------------------------------  Past Medical History:  has a past medical history of Arthritis, Asthma, Bipolar 1 disorder (HCC), Gross hematuria, HIV (human immunodeficiency virus infection) (HCC), Methamphetamine abuse in remission, Migraine, Primary hypertension, Psychiatric problem, PTSD (post-traumatic stress disorder), Recurrent major depressive disorder, in partial remission, Seizures (HCC), Severe alcohol use disorder, and Ureteral calculus, left.    Past Surgical History:  has a past surgical history that includes Colonoscopy; Abdomen surgery; Cystocopy (N/A, 05/09/2017); Ureteroscopy (Left, 06/09/2017); other surgical history; Neck surgery; Tonsillectomy; polypectomy; and other surgical history.    Social History:  reports that he has quit smoking. His smoking use included cigarettes. He started smoking about 17 years ago. He has a 4.5 pack-year

## 2025-04-13 ENCOUNTER — HOSPITAL ENCOUNTER (EMERGENCY)
Age: 40
Discharge: HOME | End: 2025-04-13
Attending: EMERGENCY MEDICINE
Payer: MEDICAID

## 2025-04-13 ENCOUNTER — APPOINTMENT (OUTPATIENT)
Dept: CT IMAGING | Age: 40
End: 2025-04-13
Payer: MEDICAID

## 2025-04-13 ENCOUNTER — APPOINTMENT (OUTPATIENT)
Dept: GENERAL RADIOLOGY | Age: 40
End: 2025-04-13
Payer: MEDICAID

## 2025-04-13 VITALS
SYSTOLIC BLOOD PRESSURE: 140 MMHG | RESPIRATION RATE: 10 BRPM | OXYGEN SATURATION: 94 % | TEMPERATURE: 97.7 F | DIASTOLIC BLOOD PRESSURE: 81 MMHG | HEART RATE: 79 BPM

## 2025-04-13 DIAGNOSIS — F10.920 ACUTE ALCOHOLIC INTOXICATION WITHOUT COMPLICATION: Primary | ICD-10-CM

## 2025-04-13 DIAGNOSIS — R94.31 PROLONGED QT INTERVAL: ICD-10-CM

## 2025-04-13 DIAGNOSIS — R11.0 NAUSEA: ICD-10-CM

## 2025-04-13 DIAGNOSIS — R82.5 POSITIVE URINE DRUG SCREEN: ICD-10-CM

## 2025-04-13 DIAGNOSIS — S09.90XA CLOSED HEAD INJURY, INITIAL ENCOUNTER: ICD-10-CM
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
## 2025-04-13 LAB
ALBUMIN SERPL-MCNC: 5.1 G/DL (ref 3.5–5.2)
ALP SERPL-CCNC: 110 U/L (ref 40–129)
ALT SERPL-CCNC: 74 U/L (ref 0–40)
AMPHET UR QL SCN: NEGATIVE
ANION GAP SERPL CALCULATED.3IONS-SCNC: 15 MMOL/L (ref 7–16)
APAP SERPL-MCNC: <5 UG/ML (ref 10–30)
AST SERPL-CCNC: 170 U/L (ref 0–39)
BARBITURATES UR QL SCN: POSITIVE
BASOPHILS # BLD: 0.06 K/UL (ref 0–0.2)
BASOPHILS NFR BLD: 1 % (ref 0–2)
BENZODIAZ UR QL: NEGATIVE
BILIRUB SERPL-MCNC: 0.2 MG/DL (ref 0–1.2)
BUN SERPL-MCNC: 15 MG/DL (ref 6–20)
BUPRENORPHINE UR QL: NEGATIVE
CALCIUM SERPL-MCNC: 9.1 MG/DL (ref 8.6–10.2)
CANNABINOIDS UR QL SCN: POSITIVE
CHLORIDE SERPL-SCNC: 99 MMOL/L (ref 98–107)
CO2 SERPL-SCNC: 28 MMOL/L (ref 22–29)
COCAINE UR QL SCN: NEGATIVE
CREAT SERPL-MCNC: 0.9 MG/DL (ref 0.7–1.2)
EOSINOPHIL # BLD: 0.1 K/UL (ref 0.05–0.5)
EOSINOPHILS RELATIVE PERCENT: 2 % (ref 0–6)
ERYTHROCYTE [DISTWIDTH] IN BLOOD BY AUTOMATED COUNT: 13.2 % (ref 11.5–15)
ETHANOLAMINE SERPL-MCNC: 267 MG/DL (ref 0–0.08)
ETHANOLAMINE SERPL-MCNC: 493 MG/DL (ref 0–0.08)
FENTANYL UR QL: NEGATIVE
GFR, ESTIMATED: >90 ML/MIN/1.73M2
GLUCOSE SERPL-MCNC: 135 MG/DL (ref 74–99)
HCT VFR BLD AUTO: 42.9 % (ref 37–54)
HGB BLD-MCNC: 14.7 G/DL (ref 12.5–16.5)
IMM GRANULOCYTES # BLD AUTO: <0.03 K/UL (ref 0–0.58)
IMM GRANULOCYTES NFR BLD: 0 % (ref 0–5)
LYMPHOCYTES NFR BLD: 2.47 K/UL (ref 1.5–4)
LYMPHOCYTES RELATIVE PERCENT: 53 % (ref 20–42)
MAGNESIUM SERPL-MCNC: 2.3 MG/DL (ref 1.6–2.6)
MCH RBC QN AUTO: 32 PG (ref 26–35)
MCHC RBC AUTO-ENTMCNC: 34.3 G/DL (ref 32–34.5)
MCV RBC AUTO: 93.5 FL (ref 80–99.9)
METHADONE UR QL: NEGATIVE
MONOCYTES NFR BLD: 0.39 K/UL (ref 0.1–0.95)
MONOCYTES NFR BLD: 8 % (ref 2–12)
NEUTROPHILS NFR BLD: 35 % (ref 43–80)
NEUTS SEG NFR BLD: 1.62 K/UL (ref 1.8–7.3)
OPIATES UR QL SCN: NEGATIVE
OXYCODONE UR QL SCN: NEGATIVE
PCP UR QL SCN: NEGATIVE
PLATELET # BLD AUTO: 181 K/UL (ref 130–450)
PMV BLD AUTO: 9 FL (ref 7–12)
POTASSIUM SERPL-SCNC: 3.3 MMOL/L (ref 3.5–5)
PROT SERPL-MCNC: 7.8 G/DL (ref 6.4–8.3)
RBC # BLD AUTO: 4.59 M/UL (ref 3.8–5.8)
SALICYLATES SERPL-MCNC: <0.3 MG/DL (ref 0–30)
SODIUM SERPL-SCNC: 142 MMOL/L (ref 132–146)
TEST INFORMATION: ABNORMAL
TOXIC TRICYCLIC SC,BLOOD: NEGATIVE
TROPONIN I SERPL HS-MCNC: 10 NG/L (ref 0–11)
WBC OTHER # BLD: 4.7 K/UL (ref 4.5–11.5)

## 2025-04-13 PROCEDURE — 80143 DRUG ASSAY ACETAMINOPHEN: CPT

## 2025-04-13 PROCEDURE — 96365 THER/PROPH/DIAG IV INF INIT: CPT

## 2025-04-13 PROCEDURE — 80053 COMPREHEN METABOLIC PANEL: CPT

## 2025-04-13 PROCEDURE — G0480 DRUG TEST DEF 1-7 CLASSES: HCPCS

## 2025-04-13 PROCEDURE — 6360000002 HC RX W HCPCS

## 2025-04-13 PROCEDURE — 83735 ASSAY OF MAGNESIUM: CPT

## 2025-04-13 PROCEDURE — 6370000000 HC RX 637 (ALT 250 FOR IP)

## 2025-04-13 PROCEDURE — 80307 DRUG TEST PRSMV CHEM ANLYZR: CPT

## 2025-04-13 PROCEDURE — 72125 CT NECK SPINE W/O DYE: CPT

## 2025-04-13 PROCEDURE — 96366 THER/PROPH/DIAG IV INF ADDON: CPT

## 2025-04-13 PROCEDURE — 80179 DRUG ASSAY SALICYLATE: CPT

## 2025-04-13 PROCEDURE — 71045 X-RAY EXAM CHEST 1 VIEW: CPT

## 2025-04-13 PROCEDURE — 96375 TX/PRO/DX INJ NEW DRUG ADDON: CPT

## 2025-04-13 PROCEDURE — 85025 COMPLETE CBC W/AUTO DIFF WBC: CPT

## 2025-04-13 PROCEDURE — 2580000003 HC RX 258

## 2025-04-13 PROCEDURE — 99285 EMERGENCY DEPT VISIT HI MDM: CPT

## 2025-04-13 PROCEDURE — 70450 CT HEAD/BRAIN W/O DYE: CPT

## 2025-04-13 PROCEDURE — 93005 ELECTROCARDIOGRAM TRACING: CPT

## 2025-04-13 PROCEDURE — 84484 ASSAY OF TROPONIN QUANT: CPT

## 2025-04-13 RX ORDER — GAUZE BANDAGE 2" X 2"
100 BANDAGE TOPICAL ONCE
Status: COMPLETED | OUTPATIENT
Start: 2025-04-13 | End: 2025-04-13

## 2025-04-13 RX ORDER — FOLIC ACID 1 MG/1
1 TABLET ORAL ONCE
Status: COMPLETED | OUTPATIENT
Start: 2025-04-13 | End: 2025-04-13

## 2025-04-13 RX ORDER — ONDANSETRON 2 MG/ML
4 INJECTION INTRAMUSCULAR; INTRAVENOUS ONCE
Status: COMPLETED | OUTPATIENT
Start: 2025-04-13 | End: 2025-04-13

## 2025-04-13 RX ORDER — 0.9 % SODIUM CHLORIDE 0.9 %
1000 INTRAVENOUS SOLUTION INTRAVENOUS ONCE
Status: COMPLETED | OUTPATIENT
Start: 2025-04-13 | End: 2025-04-13

## 2025-04-13 RX ORDER — LEVETIRACETAM 500 MG/5ML
1000 INJECTION, SOLUTION, CONCENTRATE INTRAVENOUS ONCE
Status: COMPLETED | OUTPATIENT
Start: 2025-04-13 | End: 2025-04-13

## 2025-04-13 RX ORDER — MAGNESIUM SULFATE IN WATER 40 MG/ML
2000 INJECTION, SOLUTION INTRAVENOUS ONCE
Status: COMPLETED | OUTPATIENT
Start: 2025-04-13 | End: 2025-04-13

## 2025-04-13 RX ADMIN — SODIUM CHLORIDE 1000 ML: 0.9 INJECTION, SOLUTION INTRAVENOUS at 11:48

## 2025-04-13 RX ADMIN — Medication 100 MG: at 14:06

## 2025-04-13 RX ADMIN — ONDANSETRON 4 MG: 2 INJECTION, SOLUTION INTRAMUSCULAR; INTRAVENOUS at 11:47

## 2025-04-13 RX ADMIN — MAGNESIUM SULFATE IN WATER FOR 2000 MG: 40 INJECTION INTRAVENOUS at 12:28

## 2025-04-13 RX ADMIN — FOLIC ACID 1 MG: 1 TABLET ORAL at 14:06

## 2025-04-13 RX ADMIN — LEVETIRACETAM 1000 MG: 500 INJECTION, SOLUTION, CONCENTRATE INTRAVENOUS at 11:48

## 2025-04-13 NOTE — ED PROVIDER NOTES
components:    Barbiturate Screen, Ur POSITIVE (*)     Cannabinoid Scrn, Ur POSITIVE (*)     All other components within normal limits   COMPREHENSIVE METABOLIC PANEL - Abnormal; Notable for the following components:    Potassium 3.3 (*)     Glucose 135 (*)     ALT 74 (*)      (*)     All other components within normal limits   CBC WITH AUTO DIFFERENTIAL - Abnormal; Notable for the following components:    Neutrophils % 35 (*)     Lymphocytes % 53 (*)     Neutrophils Absolute 1.62 (*)     All other components within normal limits   ETHANOL - Abnormal; Notable for the following components:    Ethanol Lvl 267 (*)     All other components within normal limits   TROPONIN   MAGNESIUM       As interpreted by me, the above displayed labs are abnormal. All other labs obtained during this visit were within normal range or not returned as of this dictation.      EKG Interpretation  Interpreted by emergency department physician, Nichelle Brown DO      EKG:  This EKG is signed and interpreted by me.    Rate: 103  Rhythm: Sinus  Interpretation: Normal axis.  Incomplete right bundle branch block.  QTc 503 ms.  Comparison: no previous EKG          RADIOLOGY:   Non-plain film images such as CT, Ultrasound and MRI are read by the radiologist. Plain radiographic images are visualized and preliminarily interpreted by the ED Provider with the below findings:    No evidence of pneumothorax or rib fracture on chest x-ray    Interpretation per the Radiologist below, if available at the time of this note:    CT Head W/O Contrast   Final Result   No acute intracranial abnormality.         CT CSpine W/O Contrast   Final Result   No acute abnormality of the cervical spine.         XR CHEST 1 VIEW   Final Result   No acute process.           CT HEAD WO CONTRAST  Result Date: 4/8/2025  EXAMINATION: CT OF THE HEAD WITHOUT CONTRAST  4/8/2025 11:18 pm TECHNIQUE: CT of the head was performed without the administration of intravenous

## 2025-04-13 NOTE — ED NOTES
Pt found  bed smoking vape pen for the second time. Pt was re-informed that he could not smoke that in the hospital and vape was taken and pt informed he could have it back on discharge.

## 2025-04-14 LAB
EKG ATRIAL RATE: 103 BPM
EKG P AXIS: 76 DEGREES
EKG P-R INTERVAL: 122 MS
EKG Q-T INTERVAL: 384 MS
EKG QRS DURATION: 100 MS
EKG QTC CALCULATION (BAZETT): 503 MS
EKG R AXIS: 90 DEGREES
EKG T AXIS: 73 DEGREES
EKG VENTRICULAR RATE: 103 BPM

## 2025-04-14 PROCEDURE — 93010 ELECTROCARDIOGRAM REPORT: CPT | Performed by: INTERNAL MEDICINE

## 2025-05-05 ENCOUNTER — HOSPITAL ENCOUNTER (OUTPATIENT)
Age: 40
Discharge: HOME | End: 2025-05-05
Payer: MEDICAID

## 2025-05-05 LAB
ALBUMIN SERPL-MCNC: 5 G/DL (ref 3.5–5.2)
ALP SERPL-CCNC: 80 U/L (ref 40–129)
ALT SERPL-CCNC: 14 U/L (ref 0–40)
ANION GAP SERPL CALCULATED.3IONS-SCNC: 13 MMOL/L (ref 7–16)
AST SERPL-CCNC: 15 U/L (ref 0–39)
BASOPHILS # BLD: 0.1 K/UL (ref 0–0.2)
BASOPHILS NFR BLD: 1 % (ref 0–2)
BILIRUB SERPL-MCNC: 0.2 MG/DL (ref 0–1.2)
BUN SERPL-MCNC: 15 MG/DL (ref 6–20)
CALCIUM SERPL-MCNC: 10.4 MG/DL (ref 8.6–10.2)
CHLORIDE SERPL-SCNC: 100 MMOL/L (ref 98–107)
CO2 SERPL-SCNC: 26 MMOL/L (ref 22–29)
CREAT SERPL-MCNC: 1 MG/DL (ref 0.7–1.2)
DATE LAST DOSE: ABNORMAL
EOSINOPHIL # BLD: 0.17 K/UL (ref 0.05–0.5)
EOSINOPHILS RELATIVE PERCENT: 2 % (ref 0–6)
ERYTHROCYTE [DISTWIDTH] IN BLOOD BY AUTOMATED COUNT: 12.3 % (ref 11.5–15)
GFR, ESTIMATED: >90 ML/MIN/1.73M2
GLUCOSE SERPL-MCNC: 91 MG/DL (ref 74–99)
HCT VFR BLD AUTO: 48.9 % (ref 37–54)
HGB BLD-MCNC: 15.9 G/DL (ref 12.5–16.5)
IMM GRANULOCYTES # BLD AUTO: 0.04 K/UL (ref 0–0.58)
IMM GRANULOCYTES NFR BLD: 1 % (ref 0–5)
LYMPHOCYTES NFR BLD: 2.35 K/UL (ref 1.5–4)
LYMPHOCYTES RELATIVE PERCENT: 32 % (ref 20–42)
MCH RBC QN AUTO: 32.5 PG (ref 26–35)
MCHC RBC AUTO-ENTMCNC: 32.5 G/DL (ref 32–34.5)
MCV RBC AUTO: 100 FL (ref 80–99.9)
MONOCYTES NFR BLD: 0.57 K/UL (ref 0.1–0.95)
MONOCYTES NFR BLD: 8 % (ref 2–12)
NEUTROPHILS NFR BLD: 56 % (ref 43–80)
NEUTS SEG NFR BLD: 4.07 K/UL (ref 1.8–7.3)
PLATELET # BLD AUTO: 169 K/UL (ref 130–450)
PMV BLD AUTO: 11.3 FL (ref 7–12)
POTASSIUM SERPL-SCNC: 4.6 MMOL/L (ref 3.5–5)
PROT SERPL-MCNC: 7.8 G/DL (ref 6.4–8.3)
RBC # BLD AUTO: 4.89 M/UL (ref 3.8–5.8)
SODIUM SERPL-SCNC: 139 MMOL/L (ref 132–146)
TME LAST DOSE: ABNORMAL H
TSH SERPL DL<=0.05 MIU/L-ACNC: 1.21 UIU/ML (ref 0.27–4.2)
VALPROATE SERPL-MCNC: 37 UG/ML (ref 50–100)
VANCOMYCIN DOSE: ABNORMAL MG
WBC OTHER # BLD: 7.3 K/UL (ref 4.5–11.5)

## 2025-05-05 PROCEDURE — 85025 COMPLETE CBC W/AUTO DIFF WBC: CPT

## 2025-05-05 PROCEDURE — 87536 HIV-1 QUANT&REVRSE TRNSCRPJ: CPT

## 2025-05-05 PROCEDURE — 86702 HIV-2 ANTIBODY: CPT

## 2025-05-05 PROCEDURE — 87389 HIV-1 AG W/HIV-1&-2 AB AG IA: CPT

## 2025-05-05 PROCEDURE — 86361 T CELL ABSOLUTE COUNT: CPT

## 2025-05-05 PROCEDURE — 36415 COLL VENOUS BLD VENIPUNCTURE: CPT

## 2025-05-05 PROCEDURE — 80164 ASSAY DIPROPYLACETIC ACD TOT: CPT

## 2025-05-05 PROCEDURE — 80053 COMPREHEN METABOLIC PANEL: CPT

## 2025-05-05 PROCEDURE — 80074 ACUTE HEPATITIS PANEL: CPT

## 2025-05-05 PROCEDURE — 86701 HIV-1ANTIBODY: CPT

## 2025-05-05 PROCEDURE — 84443 ASSAY THYROID STIM HORMONE: CPT

## 2025-05-07 LAB
HAV IGM SERPL QL IA: NONREACTIVE
HBV CORE IGM SERPL QL IA: NONREACTIVE
HBV SURFACE AG SERPL QL IA: NONREACTIVE
HCV AB SERPL QL IA: NONREACTIVE
HIV 1+2 AB+HIV1 P24 AG SERPL QL IA: REACTIVE

## 2025-05-08 LAB
CD3+CD4+ CELLS # BLD: 1352 CELLS/UL (ref 430–1800)
CD3+CD4+ CELLS # BLD: 47 % (ref 32–64)
IMMUNODEFICIENCY MARKERS SPEC-IMP: NORMAL

## 2025-05-09 LAB
HIV1 RNA # SERPL NAA+PROBE: <20 CPY/ML
HIV1 RNA # SERPL NAA+PROBE: DETECTED {COPIES}/ML
HIV1 RNA SERPL NAA+PROBE-LOG#: <1.3 LOG CPY/ML
SPECIMEN SOURCE: ABNORMAL

## 2025-05-12 LAB
HIV 1 & 2 AB SERPLBLD IA.RAPID: ABNORMAL
HIV 2 AB SERPLBLD QL IA.RAPID: NEGATIVE
HIV1 AB SERPLBLD QL IA.RAPID: POSITIVE

## 2025-07-16 ENCOUNTER — TRANSCRIBE ORDERS (OUTPATIENT)
Dept: ADMINISTRATIVE | Age: 40
End: 2025-07-16

## 2025-07-16 DIAGNOSIS — G43.901 MIGRAINE WITH STATUS MIGRAINOSUS, NOT INTRACTABLE, UNSPECIFIED MIGRAINE TYPE: Primary | ICD-10-CM

## 2025-08-14 ENCOUNTER — HOSPITAL ENCOUNTER (OUTPATIENT)
Dept: CT IMAGING | Age: 40
Discharge: HOME | End: 2025-08-16

## 2025-08-14 DIAGNOSIS — G43.901 MIGRAINE WITH STATUS MIGRAINOSUS, NOT INTRACTABLE, UNSPECIFIED MIGRAINE TYPE: ICD-10-CM
